# Patient Record
Sex: FEMALE | Race: WHITE | Employment: OTHER | URBAN - METROPOLITAN AREA
[De-identification: names, ages, dates, MRNs, and addresses within clinical notes are randomized per-mention and may not be internally consistent; named-entity substitution may affect disease eponyms.]

---

## 2017-02-14 ENCOUNTER — HOSPITAL ENCOUNTER (EMERGENCY)
Age: 82
Discharge: HOME | End: 2017-02-14
Attending: EMERGENCY MEDICINE
Payer: MEDICARE

## 2017-02-14 ENCOUNTER — APPOINTMENT (OUTPATIENT)
Dept: GENERAL RADIOLOGY | Age: 82
End: 2017-02-14
Payer: MEDICARE

## 2017-02-14 VITALS
OXYGEN SATURATION: 95 % | HEIGHT: 65 IN | HEART RATE: 60 BPM | BODY MASS INDEX: 22.66 KG/M2 | TEMPERATURE: 98.4 F | DIASTOLIC BLOOD PRESSURE: 67 MMHG | RESPIRATION RATE: 20 BRPM | WEIGHT: 136 LBS | SYSTOLIC BLOOD PRESSURE: 146 MMHG

## 2017-02-14 DIAGNOSIS — R07.9 CHEST PAIN, UNSPECIFIED TYPE: Primary | ICD-10-CM

## 2017-02-14 LAB
ABSOLUTE EOS #: 0.3 K/UL (ref 0–0.4)
ABSOLUTE LYMPH #: 2.4 K/UL (ref 1–4.8)
ABSOLUTE MONO #: 0.5 K/UL (ref 0.1–1.2)
ANION GAP SERPL CALCULATED.3IONS-SCNC: 15 MMOL/L (ref 9–17)
BASOPHILS # BLD: 1 % (ref 0–2)
BASOPHILS ABSOLUTE: 0 K/UL (ref 0–0.2)
BUN BLDV-MCNC: 13 MG/DL (ref 8–23)
BUN/CREAT BLD: ABNORMAL (ref 9–20)
CALCIUM SERPL-MCNC: 8.9 MG/DL (ref 8.6–10.4)
CHLORIDE BLD-SCNC: 98 MMOL/L (ref 98–107)
CO2: 24 MMOL/L (ref 20–31)
CREAT SERPL-MCNC: 0.84 MG/DL (ref 0.5–0.9)
DIFFERENTIAL TYPE: NORMAL
EOSINOPHILS RELATIVE PERCENT: 4 % (ref 1–4)
GFR AFRICAN AMERICAN: >60 ML/MIN
GFR NON-AFRICAN AMERICAN: >60 ML/MIN
GFR SERPL CREATININE-BSD FRML MDRD: ABNORMAL ML/MIN/{1.73_M2}
GFR SERPL CREATININE-BSD FRML MDRD: ABNORMAL ML/MIN/{1.73_M2}
GLUCOSE BLD-MCNC: 129 MG/DL (ref 70–99)
HCT VFR BLD CALC: 41.3 % (ref 36–46)
HEMOGLOBIN: 14.1 G/DL (ref 12–16)
LYMPHOCYTES # BLD: 34 % (ref 24–44)
MCH RBC QN AUTO: 30.1 PG (ref 26–34)
MCHC RBC AUTO-ENTMCNC: 34 G/DL (ref 31–37)
MCV RBC AUTO: 88.6 FL (ref 80–100)
MONOCYTES # BLD: 7 % (ref 2–11)
PDW BLD-RTO: 13.9 % (ref 12.5–15.4)
PLATELET # BLD: 212 K/UL (ref 140–450)
PLATELET ESTIMATE: NORMAL
PMV BLD AUTO: 9.1 FL (ref 6–12)
POC TROPONIN I: 0 NG/ML (ref 0–0.1)
POC TROPONIN I: 0.01 NG/ML (ref 0–0.1)
POC TROPONIN INTERP: NORMAL
POC TROPONIN INTERP: NORMAL
POTASSIUM SERPL-SCNC: 4.1 MMOL/L (ref 3.7–5.3)
RBC # BLD: 4.66 M/UL (ref 4–5.2)
RBC # BLD: NORMAL 10*6/UL
SEG NEUTROPHILS: 54 % (ref 36–66)
SEGMENTED NEUTROPHILS ABSOLUTE COUNT: 3.9 K/UL (ref 1.8–7.7)
SODIUM BLD-SCNC: 137 MMOL/L (ref 135–144)
WBC # BLD: 7.1 K/UL (ref 3.5–11)
WBC # BLD: NORMAL 10*3/UL

## 2017-02-14 PROCEDURE — 71020 XR CHEST STANDARD TWO VW: CPT | Performed by: RADIOLOGY

## 2017-02-14 PROCEDURE — 85025 COMPLETE CBC W/AUTO DIFF WBC: CPT

## 2017-02-14 PROCEDURE — 80048 BASIC METABOLIC PNL TOTAL CA: CPT

## 2017-02-14 PROCEDURE — 93005 ELECTROCARDIOGRAM TRACING: CPT

## 2017-02-14 PROCEDURE — 84484 ASSAY OF TROPONIN QUANT: CPT

## 2017-02-14 PROCEDURE — 6370000000 HC RX 637 (ALT 250 FOR IP): Performed by: STUDENT IN AN ORGANIZED HEALTH CARE EDUCATION/TRAINING PROGRAM

## 2017-02-14 PROCEDURE — 99285 EMERGENCY DEPT VISIT HI MDM: CPT

## 2017-02-14 PROCEDURE — 71020 XR CHEST STANDARD TWO VW: CPT

## 2017-02-14 RX ORDER — ASPIRIN 81 MG/1
324 TABLET, CHEWABLE ORAL ONCE
Status: COMPLETED | OUTPATIENT
Start: 2017-02-14 | End: 2017-02-14

## 2017-02-14 RX ADMIN — ASPIRIN 81 MG 324 MG: 81 TABLET ORAL at 20:11

## 2017-02-14 ASSESSMENT — ENCOUNTER SYMPTOMS
WHEEZING: 0
DIARRHEA: 0
ABDOMINAL PAIN: 0
CHEST TIGHTNESS: 0
SHORTNESS OF BREATH: 0
CONSTIPATION: 0
NAUSEA: 1
VOMITING: 0

## 2017-02-14 ASSESSMENT — HEART SCORE: ECG: 1

## 2017-02-16 LAB
EKG ATRIAL RATE: 61 BPM
EKG P AXIS: 64 DEGREES
EKG P-R INTERVAL: 138 MS
EKG Q-T INTERVAL: 396 MS
EKG QRS DURATION: 62 MS
EKG QTC CALCULATION (BAZETT): 398 MS
EKG R AXIS: 53 DEGREES
EKG T AXIS: 180 DEGREES
EKG VENTRICULAR RATE: 61 BPM

## 2017-08-17 ENCOUNTER — APPOINTMENT (OUTPATIENT)
Dept: GENERAL RADIOLOGY | Age: 82
End: 2017-08-17
Payer: MEDICARE

## 2017-08-17 ENCOUNTER — HOSPITAL ENCOUNTER (EMERGENCY)
Age: 82
Discharge: HOME | End: 2017-08-17
Attending: EMERGENCY MEDICINE
Payer: MEDICARE

## 2017-08-17 VITALS
BODY MASS INDEX: 22.28 KG/M2 | DIASTOLIC BLOOD PRESSURE: 49 MMHG | OXYGEN SATURATION: 98 % | HEIGHT: 64 IN | SYSTOLIC BLOOD PRESSURE: 155 MMHG | WEIGHT: 130.51 LBS | RESPIRATION RATE: 18 BRPM | TEMPERATURE: 98.3 F | HEART RATE: 62 BPM

## 2017-08-17 DIAGNOSIS — J40 BRONCHITIS: Primary | ICD-10-CM

## 2017-08-17 PROCEDURE — 71020 XR CHEST STANDARD TWO VW: CPT

## 2017-08-17 PROCEDURE — 99283 EMERGENCY DEPT VISIT LOW MDM: CPT

## 2017-08-17 PROCEDURE — 6370000000 HC RX 637 (ALT 250 FOR IP): Performed by: NURSE PRACTITIONER

## 2017-08-17 RX ORDER — AZITHROMYCIN 250 MG/1
TABLET, FILM COATED ORAL
Qty: 1 PACKET | Refills: 0 | Status: SHIPPED | OUTPATIENT
Start: 2017-08-17 | End: 2017-08-27

## 2017-08-17 RX ORDER — LEVOTHYROXINE SODIUM 0.1 MG/1
100 TABLET ORAL DAILY
COMMUNITY

## 2017-08-17 RX ORDER — BENZONATATE 100 MG/1
100 CAPSULE ORAL 3 TIMES DAILY PRN
Qty: 21 CAPSULE | Refills: 0 | Status: SHIPPED | OUTPATIENT
Start: 2017-08-17 | End: 2017-08-24

## 2017-08-17 RX ORDER — ATORVASTATIN CALCIUM 10 MG/1
10 TABLET, FILM COATED ORAL DAILY
COMMUNITY

## 2017-08-17 RX ORDER — BENZONATATE 100 MG/1
100 CAPSULE ORAL ONCE
Status: COMPLETED | OUTPATIENT
Start: 2017-08-17 | End: 2017-08-17

## 2017-08-17 RX ADMIN — BENZONATATE 100 MG: 100 CAPSULE ORAL at 10:43

## 2017-08-17 ASSESSMENT — ENCOUNTER SYMPTOMS
SHORTNESS OF BREATH: 0
WHEEZING: 0
SORE THROAT: 0
COUGH: 1
RHINORRHEA: 1
SINUS PRESSURE: 0

## 2017-08-17 ASSESSMENT — PAIN DESCRIPTION - DESCRIPTORS: DESCRIPTORS: SHARP;STABBING

## 2017-08-17 ASSESSMENT — PAIN SCALES - GENERAL: PAINLEVEL_OUTOF10: 10

## 2017-08-17 ASSESSMENT — PAIN DESCRIPTION - PAIN TYPE: TYPE: ACUTE PAIN

## 2017-08-17 ASSESSMENT — PAIN DESCRIPTION - FREQUENCY: FREQUENCY: INTERMITTENT

## 2017-08-17 ASSESSMENT — PAIN DESCRIPTION - LOCATION: LOCATION: RIB CAGE

## 2017-08-17 ASSESSMENT — PAIN DESCRIPTION - ORIENTATION: ORIENTATION: LEFT;RIGHT

## 2020-09-15 ENCOUNTER — HOSPITAL ENCOUNTER (EMERGENCY)
Age: 85
Discharge: HOME | End: 2020-09-15
Attending: EMERGENCY MEDICINE
Payer: MEDICARE

## 2020-09-15 VITALS
HEART RATE: 58 BPM | DIASTOLIC BLOOD PRESSURE: 85 MMHG | BODY MASS INDEX: 22.33 KG/M2 | TEMPERATURE: 97.8 F | OXYGEN SATURATION: 99 % | HEIGHT: 65 IN | SYSTOLIC BLOOD PRESSURE: 156 MMHG | WEIGHT: 134 LBS | RESPIRATION RATE: 14 BRPM

## 2020-09-15 LAB
-: ABNORMAL
ABSOLUTE EOS #: 0.16 K/UL (ref 0–0.44)
ABSOLUTE IMMATURE GRANULOCYTE: 0.02 K/UL (ref 0–0.3)
ABSOLUTE LYMPH #: 1.84 K/UL (ref 1.1–3.7)
ABSOLUTE MONO #: 0.58 K/UL (ref 0.1–1.2)
AMORPHOUS: ABNORMAL
ANION GAP SERPL CALCULATED.3IONS-SCNC: 10 MMOL/L (ref 9–17)
BACTERIA: ABNORMAL
BASOPHILS # BLD: 2 % (ref 0–2)
BASOPHILS ABSOLUTE: 0.1 K/UL (ref 0–0.2)
BILIRUBIN URINE: NEGATIVE
BUN BLDV-MCNC: 15 MG/DL (ref 8–23)
BUN/CREAT BLD: 17 (ref 9–20)
CALCIUM SERPL-MCNC: 8.7 MG/DL (ref 8.6–10.4)
CASTS UA: ABNORMAL /LPF
CHLORIDE BLD-SCNC: 101 MMOL/L (ref 98–107)
CO2: 28 MMOL/L (ref 20–31)
COLOR: YELLOW
COMMENT UA: ABNORMAL
CREAT SERPL-MCNC: 0.89 MG/DL (ref 0.5–0.9)
CRYSTALS, UA: ABNORMAL /HPF
DIFFERENTIAL TYPE: NORMAL
EOSINOPHILS RELATIVE PERCENT: 3 % (ref 1–4)
EPITHELIAL CELLS UA: ABNORMAL /HPF (ref 0–5)
GFR AFRICAN AMERICAN: >60 ML/MIN
GFR NON-AFRICAN AMERICAN: >60 ML/MIN
GFR SERPL CREATININE-BSD FRML MDRD: ABNORMAL ML/MIN/{1.73_M2}
GFR SERPL CREATININE-BSD FRML MDRD: ABNORMAL ML/MIN/{1.73_M2}
GLUCOSE BLD-MCNC: 106 MG/DL (ref 70–99)
GLUCOSE URINE: NEGATIVE
HCT VFR BLD CALC: 41.9 % (ref 36.3–47.1)
HEMOGLOBIN: 13.5 G/DL (ref 11.9–15.1)
IMMATURE GRANULOCYTES: 0 %
KETONES, URINE: NEGATIVE
LEUKOCYTE ESTERASE, URINE: ABNORMAL
LYMPHOCYTES # BLD: 32 % (ref 24–43)
MCH RBC QN AUTO: 30 PG (ref 25.2–33.5)
MCHC RBC AUTO-ENTMCNC: 32.2 G/DL (ref 28.4–34.8)
MCV RBC AUTO: 93.1 FL (ref 82.6–102.9)
MONOCYTES # BLD: 10 % (ref 3–12)
MUCUS: ABNORMAL
NITRITE, URINE: NEGATIVE
NRBC AUTOMATED: 0 PER 100 WBC
OTHER OBSERVATIONS UA: ABNORMAL
PDW BLD-RTO: 13.2 % (ref 11.8–14.4)
PH UA: 6.5 (ref 5–8)
PLATELET # BLD: 181 K/UL (ref 138–453)
PLATELET ESTIMATE: NORMAL
PMV BLD AUTO: 10.3 FL (ref 8.1–13.5)
POTASSIUM SERPL-SCNC: 4.8 MMOL/L (ref 3.7–5.3)
PROTEIN UA: ABNORMAL
RBC # BLD: 4.5 M/UL (ref 3.95–5.11)
RBC # BLD: NORMAL 10*6/UL
RBC UA: ABNORMAL /HPF (ref 0–2)
RENAL EPITHELIAL, UA: ABNORMAL /HPF
SEG NEUTROPHILS: 53 % (ref 36–65)
SEGMENTED NEUTROPHILS ABSOLUTE COUNT: 3.11 K/UL (ref 1.5–8.1)
SODIUM BLD-SCNC: 139 MMOL/L (ref 135–144)
SPECIFIC GRAVITY UA: 1.01 (ref 1–1.03)
TRICHOMONAS: ABNORMAL
TURBIDITY: CLEAR
URINE HGB: NEGATIVE
UROBILINOGEN, URINE: NORMAL
WBC # BLD: 5.8 K/UL (ref 3.5–11.3)
WBC # BLD: NORMAL 10*3/UL
WBC UA: ABNORMAL /HPF (ref 0–5)
YEAST: ABNORMAL

## 2020-09-15 PROCEDURE — 99284 EMERGENCY DEPT VISIT MOD MDM: CPT

## 2020-09-15 PROCEDURE — 85025 COMPLETE CBC W/AUTO DIFF WBC: CPT

## 2020-09-15 PROCEDURE — 81001 URINALYSIS AUTO W/SCOPE: CPT

## 2020-09-15 PROCEDURE — 36415 COLL VENOUS BLD VENIPUNCTURE: CPT

## 2020-09-15 PROCEDURE — 80048 BASIC METABOLIC PNL TOTAL CA: CPT

## 2020-09-15 RX ORDER — AMLODIPINE BESYLATE 2.5 MG/1
2.5 TABLET ORAL DAILY
Status: ON HOLD | COMMUNITY
Start: 2020-08-18 | End: 2021-06-27 | Stop reason: HOSPADM

## 2020-09-15 RX ORDER — CETIRIZINE HYDROCHLORIDE 10 MG/1
10 TABLET ORAL DAILY
COMMUNITY

## 2020-09-15 RX ORDER — CIPROFLOXACIN 500 MG/1
500 TABLET, FILM COATED ORAL 2 TIMES DAILY
Qty: 20 TABLET | Refills: 0 | Status: SHIPPED | OUTPATIENT
Start: 2020-09-15 | End: 2020-09-25

## 2020-09-15 ASSESSMENT — ENCOUNTER SYMPTOMS
BACK PAIN: 0
ABDOMINAL PAIN: 0
BLOOD IN STOOL: 0
SHORTNESS OF BREATH: 0
CONSTIPATION: 0
DIARRHEA: 0
NAUSEA: 0
COUGH: 0
ABDOMINAL DISTENTION: 1
APNEA: 0
VOMITING: 0

## 2020-09-15 ASSESSMENT — PAIN DESCRIPTION - FREQUENCY: FREQUENCY: CONTINUOUS

## 2020-09-15 ASSESSMENT — PAIN DESCRIPTION - LOCATION: LOCATION: FLANK

## 2020-09-15 ASSESSMENT — PAIN DESCRIPTION - PROGRESSION: CLINICAL_PROGRESSION: GRADUALLY WORSENING

## 2020-09-15 ASSESSMENT — PAIN DESCRIPTION - ONSET: ONSET: ON-GOING

## 2020-09-15 ASSESSMENT — PAIN SCALES - GENERAL: PAINLEVEL_OUTOF10: 5

## 2020-09-15 ASSESSMENT — PAIN DESCRIPTION - ORIENTATION: ORIENTATION: LEFT

## 2020-09-15 ASSESSMENT — PAIN DESCRIPTION - PAIN TYPE: TYPE: ACUTE PAIN

## 2020-09-15 NOTE — ED PROVIDER NOTES
[address] [place] [address] ED  Emergency Department Encounter  EmergencyMedicine Resident     Pt Name:Yolis Humphrey  MRN: [id number]  [place] 1935  Date of evaluation: 9/15/20  PCP:  MD Wil Randolph       Chief Complaint   Patient presents with    Flank Pain     left side and flank pain for approx 3 weeks, worse over past 2 days       HISTORY OF PRESENT ILLNESS  (Location/Symptom, Timing/Onset, Context/Setting, Quality, Duration, Modifying Factors, Severity.)      Val Holman is a 81 y.o. female with significant past medical history of joint replacement, hypercholesterolemia, hypothyroidism who presents to the emergency department at [facility] today with complaints of left-sided flank pain. Patient states for the last few weeks she has had increasing pain and discomfort, rates the pain as 40 5 out of 10, has not taken any medication at home. States she has not noticed any blood in her urine except for one episode 3 months ago for which she was treated. Has had no foul odor to her urine, states she usually always has urinary urgency and frequency. Has not noticed any blood in her stool, denies any shortness of breath or chest pain, no headaches or visual changes. Has not noticed any swelling or weakness of her upper and lower extremities. She states the pain is not debilitating, however she also has abdominal bloating and distention due to an ileostomy reversed in 2001. Patient states she takes enough fiber in her diet, is otherwise active in her daily life. Does not walk as much as she used to. PAST MEDICAL / SURGICAL / SOCIAL / FAMILY HISTORY      has a past medical history of Hyperlipidemia, Hypertension, and Thyroid disease. has a past surgical history that includes shoulder surgery; Cataract removal; joint replacement; Abdomen surgery; colostomy; Revision Colostomy; Colon surgery; and back surgery. Social History     Socioeconomic History    Marital status:   Spouse name: Not on file    Number of children: Not on file    Years of education: Not on file    Highest education level: Not on file   Occupational History    Not on file   Social Needs    Financial resource strain: Not on file    Food insecurity     Worry: Not on file     Inability: Not on file    Transportation needs     Medical: Not on file     Non-medical: Not on file   Tobacco Use    Smoking status: Former Smoker    Smokeless tobacco: Never Used   Substance and Sexual Activity    Alcohol use: Yes     Alcohol/week: 1.0 standard drinks     Types: 1 Glasses of wine per week    Drug use: No    Sexual activity: Not on file   Lifestyle    Physical activity     Days per week: Not on file     Minutes per session: Not on file    Stress: Not on file   Relationships    Social connections     Talks on phone: Not on file     Gets together: Not on file     Attends Christianity service: Not on file     Active member of club or organization: Not on file     Attends meetings of clubs or organizations: Not on file     Relationship status: Not on file    Intimate partner violence     Fear of current or ex partner: Not on file     Emotionally abused: Not on file     Physically abused: Not on file     Forced sexual activity: Not on file   Other Topics Concern    Not on file   Social History Narrative    Not on file       History reviewed. No pertinent family history. Allergies:  Morphine    Home Medications:  Prior to Admission medications    Medication Sig Start Date End Date Taking?  Authorizing Provider   amLODIPine (NORVASC) 2.5 MG tablet TAKE 1 TABLET BY MOUTH ONE TIME A DAY 8/18/20  Yes Historical Provider, MD   cetirizine (ZYRTEC) 10 MG tablet Take 10 mg by mouth daily   Yes Historical Provider, MD   ciprofloxacin (CIPRO) 500 MG tablet Take 1 tablet by mouth 2 times daily for 10 days 9/15/20 9/25/20 Yes Patricia Cook MD   atorvastatin (LIPITOR) 10 MG tablet Take 10 mg by mouth daily   Yes Historical General: No focal deficit present. Mental Status: She is alert and oriented to person, place, and time. Mental status is at baseline.          DIFFERENTIAL  DIAGNOSIS     PLAN (LABS / IMAGING / EKG):  Orders Placed This Encounter   Procedures    CBC Auto Differential    Basic Metabolic Panel w/ Reflex to MG    Urinalysis, Routine    Microscopic Urinalysis       MEDICATIONS ORDERED:  Orders Placed This Encounter   Medications    ciprofloxacin (CIPRO) 500 MG tablet     Sig: Take 1 tablet by mouth 2 times daily for 10 days     Dispense:  20 tablet     Refill:  0       DDX:   Pyelonephritis  Acute cystitis  aortic dissection      DIAGNOSTIC RESULTS / EMERGENCY DEPARTMENT COURSE / MDM     Results for orders placed or performed during the hospital encounter of 09/15/20   CBC Auto Differential   Result Value Ref Range    WBC 5.8 3.5 - 11.3 k/uL    RBC 4.50 3.95 - 5.11 m/uL    Hemoglobin 13.5 11.9 - 15.1 g/dL    Hematocrit 41.9 36.3 - 47.1 %    MCV 93.1 82.6 - 102.9 fL    MCH 30.0 25.2 - 33.5 pg    MCHC 32.2 28.4 - 34.8 g/dL    RDW 13.2 11.8 - 14.4 %    Platelets 753 707 - 012 k/uL    MPV 10.3 8.1 - 13.5 fL    NRBC Automated 0.0 0.0 per 100 WBC    Differential Type NOT REPORTED     Seg Neutrophils 53 36 - 65 %    Lymphocytes 32 24 - 43 %    Monocytes 10 3 - 12 %    Eosinophils % 3 1 - 4 %    Basophils 2 0 - 2 %    Immature Granulocytes 0 0 %    Segs Absolute 3.11 1.50 - 8.10 k/uL    Absolute Lymph # 1.84 1.10 - 3.70 k/uL    Absolute Mono # 0.58 0.10 - 1.20 k/uL    Absolute Eos # 0.16 0.00 - 0.44 k/uL    Basophils Absolute 0.10 0.00 - 0.20 k/uL    Absolute Immature Granulocyte 0.02 0.00 - 0.30 k/uL    WBC Morphology NOT REPORTED     RBC Morphology NOT REPORTED     Platelet Estimate NOT REPORTED    Basic Metabolic Panel w/ Reflex to MG   Result Value Ref Range    Glucose 106 (H) 70 - 99 mg/dL    BUN 15 8 - 23 mg/dL    CREATININE 0.89 0.50 - 0.90 mg/dL    Bun/Cre Ratio 17 9 - 20    Calcium 8.7 8.6 - 10.4 mg/dL NOT REPORTED   Bacteria, UA FEW(!)   Mucus, UA 2+(!)   Trichomonas, UA NOT REPORTED   Amorphous, UA NOT REPORTED   Other Observations UA NOT REPORTED   Yeast, Urine NOT REPORTED [NT]   1046 Urinalysis, Routine(!):    Color, UA YELLOW   Turbidity UA CLEAR   Glucose, UA NEGATIVE   Bilirubin, Urine NEGATIVE   Ketones, Urine NEGATIVE   Specific [place], UA 1.015   Urine Hgb NEGATIVE   pH, UA 6.5   Protein, UA 1+(!)   Urobilinogen, Urine Normal   Nitrite, Urine NEGATIVE   Leukocyte Esterase, Urine TRACE(!)   Urinalysis Comments NOT REPORTED [NT]   3905 Basic Metabolic Panel w/ Reflex to MG(!):    Glucose 106(!)   BUN 15   Creatinine 0.89   Bun/Cre Ratio 17   Calcium 8.7   Sodium 139   Potassium 4.8   Chloride 101   CO2 28   Anion Gap 10   GFR Non- >60   GFR  >60   GFR Comment        GFR Staging NOT REPORTED [NT]   1046 CBC Auto Differential:    WBC 5.8   RBC 4.50   Hemoglobin Quant 13.5   Hematocrit 41.9   MCV 93.1   MCH 30.0   MCHC 32.2   RDW 13.2   Platelet Count 797   MPV 10.3   NRBC Automated 0.0   Differential Type NOT REPORTED   Seg Neutrophils 53   Lymphocytes 32   Monocytes 10   Eosinophils % 3   Basophils 2   Immature Granulocytes 0   Segs Absolute 3.11   Absolute Lymph # 1.84   Absolute Mono # 0.58   Absolute Eos # 0.16   Basophils Absolute 0.10   Absolute Immature Granulocyte 0.02   WBC Morphology NOT REPORTED   RBC Mo. .. [NT]   1046 BP(!): 156/85 [NT]   1046 Pulse: 58 [NT]   1046 Resp: 14 [NT]   1046 SpO2: 99 % [NT]   1046 Temp: 97.8 °F (36.6 °C) [NT]      ED Course User Index  [NT] Edwina Cochran MD         PROCEDURES:  None    CONSULTS:  None    CRITICAL CARE:  None    FINAL IMPRESSION      1. Flank pain    2.  Acute cystitis without hematuria          DISPOSITION / PLAN     DISPOSITION Decision To Discharge 09/15/2020 10:43:57 AM  Home     PATIENT REFERRED TO:  Catia Johnston MD  [address] [facility] [address] [phone]    Go in 1 week  As needed      DISCHARGE MEDICATIONS:  New Prescriptions    CIPROFLOXACIN (CIPRO) 500 MG TABLET    Take 1 tablet by mouth 2 times daily for 10 days         Corrinne Craze, MD   Emergency Medicine Rotating Resident  Family Medicine Residency, PGY-2   [facility]  9/15/2020 10:45 AM     (Please note that portions of thisnote were completed with a voice recognition program.  Efforts were made to edit the dictations but occasionally words are mis-transcribed.)       Corrinne Craze, MD  Resident  09/15/20 1045

## 2020-09-15 NOTE — ED NOTES
Patient came in with complaints of left sided flank plank. Stated stomach feels distended. She has been having issues for about 2 weeks and its progressively getting worse. She rates her pain at a 4. She stated it hurts when she sits down and bends forward. She reports no sign of N/V. Normal bowel movements. Alert and oriented. Independent.       Tom Holcomb RN  09/15/20 5145

## 2020-09-15 NOTE — ED PROVIDER NOTES
The patient was seen and examined by me in conjunction with the resident. I agree with his/her assessment and treatment plan. I have no clinical suspicion of pyelonephritis.      Lauren Castaneda MD  09/15/20 2569

## 2020-11-17 RX ORDER — SODIUM CHLORIDE, SODIUM LACTATE, POTASSIUM CHLORIDE, CALCIUM CHLORIDE 600; 310; 30; 20 MG/100ML; MG/100ML; MG/100ML; MG/100ML
1000 INJECTION, SOLUTION INTRAVENOUS CONTINUOUS
Status: CANCELLED | OUTPATIENT
Start: 2020-11-17

## 2020-11-18 ENCOUNTER — HOSPITAL ENCOUNTER (OUTPATIENT)
Dept: PREADMISSION TESTING | Age: 85
Discharge: HOME | End: 2020-11-22
Payer: MEDICARE

## 2020-11-18 VITALS
RESPIRATION RATE: 20 BRPM | HEIGHT: 65 IN | TEMPERATURE: 96.8 F | BODY MASS INDEX: 22.01 KG/M2 | SYSTOLIC BLOOD PRESSURE: 170 MMHG | HEART RATE: 54 BPM | DIASTOLIC BLOOD PRESSURE: 71 MMHG | WEIGHT: 132.08 LBS | OXYGEN SATURATION: 98 %

## 2020-11-18 LAB
ANION GAP SERPL CALCULATED.3IONS-SCNC: 11 MMOL/L (ref 9–17)
BUN BLDV-MCNC: 18 MG/DL (ref 8–23)
CHLORIDE BLD-SCNC: 101 MMOL/L (ref 98–107)
CO2: 26 MMOL/L (ref 20–31)
CREAT SERPL-MCNC: 0.85 MG/DL (ref 0.5–0.9)
GFR AFRICAN AMERICAN: >60 ML/MIN
GFR NON-AFRICAN AMERICAN: >60 ML/MIN
GFR SERPL CREATININE-BSD FRML MDRD: NORMAL ML/MIN/{1.73_M2}
GFR SERPL CREATININE-BSD FRML MDRD: NORMAL ML/MIN/{1.73_M2}
GLUCOSE BLD-MCNC: 92 MG/DL (ref 70–99)
HCT VFR BLD CALC: 45.6 % (ref 36.3–47.1)
HEMOGLOBIN: 14.5 G/DL (ref 11.9–15.1)
MCH RBC QN AUTO: 29.4 PG (ref 25.2–33.5)
MCHC RBC AUTO-ENTMCNC: 31.8 G/DL (ref 28.4–34.8)
MCV RBC AUTO: 92.5 FL (ref 82.6–102.9)
NRBC AUTOMATED: 0 PER 100 WBC
PDW BLD-RTO: 13.6 % (ref 11.8–14.4)
PLATELET # BLD: 217 K/UL (ref 138–453)
PMV BLD AUTO: 10.4 FL (ref 8.1–13.5)
POTASSIUM SERPL-SCNC: 5.1 MMOL/L (ref 3.7–5.3)
RBC # BLD: 4.93 M/UL (ref 3.95–5.11)
SODIUM BLD-SCNC: 138 MMOL/L (ref 135–144)
WBC # BLD: 7.1 K/UL (ref 3.5–11.3)

## 2020-11-18 PROCEDURE — 85027 COMPLETE CBC AUTOMATED: CPT

## 2020-11-18 PROCEDURE — 80051 ELECTROLYTE PANEL: CPT

## 2020-11-18 PROCEDURE — 93005 ELECTROCARDIOGRAM TRACING: CPT | Performed by: ANESTHESIOLOGY

## 2020-11-18 PROCEDURE — 84520 ASSAY OF UREA NITROGEN: CPT

## 2020-11-18 PROCEDURE — 87086 URINE CULTURE/COLONY COUNT: CPT

## 2020-11-18 PROCEDURE — 82947 ASSAY GLUCOSE BLOOD QUANT: CPT

## 2020-11-18 PROCEDURE — 82565 ASSAY OF CREATININE: CPT

## 2020-11-18 PROCEDURE — 36415 COLL VENOUS BLD VENIPUNCTURE: CPT

## 2020-11-18 RX ORDER — LOPERAMIDE HYDROCHLORIDE 2 MG/1
2 CAPSULE ORAL 4 TIMES DAILY PRN
COMMUNITY

## 2020-11-18 RX ORDER — ACETAMINOPHEN 500 MG
1 TABLET ORAL DAILY
COMMUNITY

## 2020-11-18 NOTE — PROGRESS NOTES
Anesthesia Focused Assessment    STOP-BANG Sleep Apnea Questionnaire    SNORE loudly (heard through closed doors)? No  TIRED, fatigued, sleepy during daytime? No  OBSERVED stopping breathing during sleep? No  High blood PRESSURE being treated? Yes    BMI over 35? No  AGE over 48? Yes  NECK circumference over 16\"? No  GENDER (male)? No             Total 2  High risk 5-8  Intermediate risk 3-4  Low risk 0-2    Obstructive Sleep Apnea: denies  If YES, machine used: no     Type 1 DM:   no  T2DM:  no    Coronary Artery Disease:  no  Hypertension:  yes    Active smoker:  no  Drinks Alcohol:  Wine 1 glass daily    Dentition: molar crowns    Defib / AICD / Pacemaker: no      Renal Failure/dialysis:  no    Patient was evaluated in PAT & anesthesia guidelines were applied. NPO guidelines, medication instructions and scheduled arrival time were reviewed with patient. Hx of anesthesia complications:  no  Family hx of anesthesia complications:  no                                                                                                                     Anesthesia contacted:   Yes, Dr. Ashlyn Shipman or cardiac clearance ordered: no    Patient has a history of stress test in 2017, echo, evaluation by cardiology (notes in chart). 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 Patient has a systolic murmur, aortic stenosis on echo 2017. Patient denies any cardiac symptoms including chest pain, shortness of breath, significant edema, etc.  Patient and case was reviewed with Dr. Benji Ling. No clearance requested. Will fax EKG and labs to PCP for completeness.     Kamaljit Wills PA-C  11/18/20  1:44 PM

## 2020-11-18 NOTE — H&P
History and Physical    Pt Name: Rose Sanchez  MRN: [id number]  YOB: 1935  Date of evaluation: 11/18/2020    SUBJECTIVE:   History of Chief Complaint:    Patient complains of bladder tumors. She was experiencing hematuria, so sought evaluation. She had a cystoscopy and was found to have four \"small spots\" in the bladder. She has been diagnosed with bladder tumors, scheduled for cystoscopy and TURBT. Patient denies any further hematuria, only complains of her chronic urinary frequency which has not changed. Past Medical History    has a past medical history of Allergic rhinitis, Aortic stenosis, Arthritis, History of echocardiogram, History of stress test, Hyperlipidemia, Hypertension, Murmur, Thyroid disease, Wears prescription eyeglasses, and Wellness examination. Past Surgical History   has a past surgical history that includes shoulder surgery; Cataract removal; Colon surgery; Colonoscopy; Endoscopy, colon, diagnostic; ileostomy or jejunostomy; lumbar laminectomy (x 2); and Total knee arthroplasty (Bilateral). Medications  Prior to Admission medications    Medication Sig Start Date End Date Taking?  Authorizing Provider   loperamide (IMODIUM) 2 MG capsule Take 2 mg by mouth daily   Yes Historical Provider, MD   Probiotic Product (PROBIOTIC-10 PO) Take by mouth   Yes Historical Provider, MD   amLODIPine (NORVASC) 2.5 MG tablet Dr. Mirlande Campos 8/18/20  Yes Historical Provider, MD   cetirizine (ZYRTEC) 10 MG tablet Take 10 mg by mouth daily   Yes Historical Provider, MD   atorvastatin (LIPITOR) 10 MG tablet Take 10 mg by mouth daily Dr. Claudia Vigil Provider, MD   aspirin 81 MG tablet Take 81 mg by mouth daily Stopped 11/9/20 for surgery  Dr. Mirlande Campos prescribed   Yes Historical Provider, MD   Calcium Carbonate-Vit D-Min (CALCIUM 1200) 1369-0212 MG-UNIT CHEW daily    Historical Provider, MD   levothyroxine (SYNTHROID) 88 MCG tablet Take 88 mcg by mouth Daily Dr. Gerrianne Gilford Provider, MD Allergies  is allergic to augmentin [amoxicillin-pot clavulanate]; morphine; and tequin [gatifloxacin]. Family History  family history includes Diabetes in her mother. Social History   reports that she has quit smoking. She has never used smokeless tobacco.   reports current alcohol use of about 1.0 standard drinks of alcohol per week. reports no history of drug use. Marital Status   Occupation retired    OBJECTIVE:   VITALS:  height is 5' 5\" (1.651 m) and weight is 132 lb 1.3 oz (59.9 kg). Her temporal temperature is 96.8 °F (36 °C). Her blood pressure is 170/71 (abnormal) and her pulse is 54. Her respiration is 20 and oxygen saturation is 98%. CONSTITUTIONAL:alert & oriented x 3, no acute distress. Very pleasant. Looks younger than stated age. SKIN:  Warm and dry, no rashes on exposed areas of skin. HEAD:  Normocephalic, atraumatic. EYES: PERRL. EOMs intact. Wearing glasses. EARS:  Hearing grossly WNL. NOSE:  Nares patent. No rhinorrhea. MOUTH/THROAT:  benign  NECK:supple, no lymphadenopathy  LUNGS: Clear to auscultation bilaterally, no wheezes. CARDIOVASCULAR: RRR. Systolic murmur noted. ABDOMEN: soft, non tender, non distended. EXTREMITIES: trace edema bilateral lower extremities. Testing:   EK20  Labs pending: drawn 2020     IMPRESSIONS:   1. Bladder tumors  2.  has a past medical history of Allergic rhinitis, Aortic stenosis (), Arthritis, History of echocardiogram (2017), History of stress test (2017), Hyperlipidemia, Hypertension, Murmur, Thyroid disease, Wears prescription eyeglasses, and Wellness examination. PLANS:   1.  Cystoscopy, TURBT    MANUEL FOLEY PA-C  Electronically signed 2020 at 2:45 PM

## 2020-11-19 ENCOUNTER — HOSPITAL ENCOUNTER (OUTPATIENT)
Dept: PREADMISSION TESTING | Age: 85
Setting detail: SPECIMEN
Discharge: HOME | End: 2020-11-23
Payer: MEDICARE

## 2020-11-19 LAB
CULTURE: NORMAL
EKG ATRIAL RATE: 54 BPM
EKG P AXIS: 66 DEGREES
EKG P-R INTERVAL: 142 MS
EKG Q-T INTERVAL: 448 MS
EKG QRS DURATION: 76 MS
EKG QTC CALCULATION (BAZETT): 424 MS
EKG R AXIS: 40 DEGREES
EKG T AXIS: 142 DEGREES
EKG VENTRICULAR RATE: 54 BPM
Lab: NORMAL
SPECIMEN DESCRIPTION: NORMAL

## 2020-11-19 PROCEDURE — U0003 INFECTIOUS AGENT DETECTION BY NUCLEIC ACID (DNA OR RNA); SEVERE ACUTE RESPIRATORY SYNDROME CORONAVIRUS 2 (SARS-COV-2) (CORONAVIRUS DISEASE [COVID-19]), AMPLIFIED PROBE TECHNIQUE, MAKING USE OF HIGH THROUGHPUT TECHNOLOGIES AS DESCRIBED BY CMS-2020-01-R: HCPCS

## 2020-11-19 PROCEDURE — 93010 ELECTROCARDIOGRAM REPORT: CPT | Performed by: INTERNAL MEDICINE

## 2020-11-21 LAB — SARS-COV-2, NAA: NOT DETECTED

## 2020-11-23 ENCOUNTER — HOSPITAL ENCOUNTER (OUTPATIENT)
Age: 85
Setting detail: OUTPATIENT SURGERY
Discharge: HOME | End: 2020-11-23
Attending: UROLOGY | Admitting: UROLOGY
Payer: MEDICARE

## 2020-11-23 ENCOUNTER — ANESTHESIA (OUTPATIENT)
Dept: OPERATING ROOM | Age: 85
End: 2020-11-23
Payer: MEDICARE

## 2020-11-23 ENCOUNTER — ANESTHESIA EVENT (OUTPATIENT)
Dept: OPERATING ROOM | Age: 85
End: 2020-11-23
Payer: MEDICARE

## 2020-11-23 VITALS — DIASTOLIC BLOOD PRESSURE: 103 MMHG | SYSTOLIC BLOOD PRESSURE: 156 MMHG | OXYGEN SATURATION: 99 % | TEMPERATURE: 95.8 F

## 2020-11-23 VITALS
HEART RATE: 51 BPM | RESPIRATION RATE: 14 BRPM | OXYGEN SATURATION: 97 % | BODY MASS INDEX: 21.99 KG/M2 | WEIGHT: 132 LBS | HEIGHT: 65 IN | SYSTOLIC BLOOD PRESSURE: 159 MMHG | TEMPERATURE: 97.7 F | DIASTOLIC BLOOD PRESSURE: 49 MMHG

## 2020-11-23 PROCEDURE — 2500000003 HC RX 250 WO HCPCS: Performed by: NURSE ANESTHETIST, CERTIFIED REGISTERED

## 2020-11-23 PROCEDURE — 2720000010 HC SURG SUPPLY STERILE: Performed by: UROLOGY

## 2020-11-23 PROCEDURE — 3700000000 HC ANESTHESIA ATTENDED CARE: Performed by: UROLOGY

## 2020-11-23 PROCEDURE — 7100000001 HC PACU RECOVERY - ADDTL 15 MIN: Performed by: UROLOGY

## 2020-11-23 PROCEDURE — 2580000003 HC RX 258: Performed by: ANESTHESIOLOGY

## 2020-11-23 PROCEDURE — 2580000003 HC RX 258: Performed by: UROLOGY

## 2020-11-23 PROCEDURE — 3700000001 HC ADD 15 MINUTES (ANESTHESIA): Performed by: UROLOGY

## 2020-11-23 PROCEDURE — 6370000000 HC RX 637 (ALT 250 FOR IP): Performed by: ANESTHESIOLOGY

## 2020-11-23 PROCEDURE — 6360000002 HC RX W HCPCS: Performed by: NURSE ANESTHETIST, CERTIFIED REGISTERED

## 2020-11-23 PROCEDURE — 6360000002 HC RX W HCPCS: Performed by: UROLOGY

## 2020-11-23 PROCEDURE — 7100000041 HC SPAR PHASE II RECOVERY - ADDTL 15 MIN: Performed by: UROLOGY

## 2020-11-23 PROCEDURE — 88307 TISSUE EXAM BY PATHOLOGIST: CPT

## 2020-11-23 PROCEDURE — 3600000002 HC SURGERY LEVEL 2 BASE: Performed by: UROLOGY

## 2020-11-23 PROCEDURE — 7100000040 HC SPAR PHASE II RECOVERY - FIRST 15 MIN: Performed by: UROLOGY

## 2020-11-23 PROCEDURE — 6360000002 HC RX W HCPCS: Performed by: STUDENT IN AN ORGANIZED HEALTH CARE EDUCATION/TRAINING PROGRAM

## 2020-11-23 PROCEDURE — 3600000012 HC SURGERY LEVEL 2 ADDTL 15MIN: Performed by: UROLOGY

## 2020-11-23 PROCEDURE — 7100000000 HC PACU RECOVERY - FIRST 15 MIN: Performed by: UROLOGY

## 2020-11-23 PROCEDURE — 2709999900 HC NON-CHARGEABLE SUPPLY: Performed by: UROLOGY

## 2020-11-23 RX ORDER — MAGNESIUM HYDROXIDE 1200 MG/15ML
LIQUID ORAL CONTINUOUS PRN
Status: COMPLETED | OUTPATIENT
Start: 2020-11-23 | End: 2020-11-23

## 2020-11-23 RX ORDER — SODIUM CHLORIDE, SODIUM LACTATE, POTASSIUM CHLORIDE, CALCIUM CHLORIDE 600; 310; 30; 20 MG/100ML; MG/100ML; MG/100ML; MG/100ML
INJECTION, SOLUTION INTRAVENOUS CONTINUOUS
Status: DISCONTINUED | OUTPATIENT
Start: 2020-11-23 | End: 2020-11-23 | Stop reason: HOSPADM

## 2020-11-23 RX ORDER — DEXAMETHASONE SODIUM PHOSPHATE 4 MG/ML
INJECTION, SOLUTION INTRA-ARTICULAR; INTRALESIONAL; INTRAMUSCULAR; INTRAVENOUS; SOFT TISSUE PRN
Status: DISCONTINUED | OUTPATIENT
Start: 2020-11-23 | End: 2020-11-23 | Stop reason: SDUPTHER

## 2020-11-23 RX ORDER — ROCURONIUM BROMIDE 10 MG/ML
INJECTION, SOLUTION INTRAVENOUS PRN
Status: DISCONTINUED | OUTPATIENT
Start: 2020-11-23 | End: 2020-11-23 | Stop reason: SDUPTHER

## 2020-11-23 RX ORDER — NEOSTIGMINE METHYLSULFATE 5 MG/5 ML
SYRINGE (ML) INTRAVENOUS PRN
Status: DISCONTINUED | OUTPATIENT
Start: 2020-11-23 | End: 2020-11-23 | Stop reason: SDUPTHER

## 2020-11-23 RX ORDER — AMLODIPINE BESYLATE 2.5 MG/1
2.5 TABLET ORAL ONCE
Status: COMPLETED | OUTPATIENT
Start: 2020-11-23 | End: 2020-11-23

## 2020-11-23 RX ORDER — ONDANSETRON 2 MG/ML
INJECTION INTRAMUSCULAR; INTRAVENOUS PRN
Status: DISCONTINUED | OUTPATIENT
Start: 2020-11-23 | End: 2020-11-23 | Stop reason: SDUPTHER

## 2020-11-23 RX ORDER — MITOMYCIN 40 MG/80ML
40 INJECTION, POWDER, LYOPHILIZED, FOR SOLUTION INTRAVENOUS ONCE
Status: DISCONTINUED | OUTPATIENT
Start: 2020-11-23 | End: 2020-11-23 | Stop reason: HOSPADM

## 2020-11-23 RX ORDER — MITOMYCIN 20 MG/40ML
INJECTION, POWDER, LYOPHILIZED, FOR SOLUTION INTRAVENOUS PRN
Status: DISCONTINUED | OUTPATIENT
Start: 2020-11-23 | End: 2020-11-23 | Stop reason: ALTCHOICE

## 2020-11-23 RX ORDER — LIDOCAINE HYDROCHLORIDE 10 MG/ML
INJECTION, SOLUTION EPIDURAL; INFILTRATION; INTRACAUDAL; PERINEURAL PRN
Status: DISCONTINUED | OUTPATIENT
Start: 2020-11-23 | End: 2020-11-23 | Stop reason: SDUPTHER

## 2020-11-23 RX ORDER — FENTANYL CITRATE 50 UG/ML
INJECTION, SOLUTION INTRAMUSCULAR; INTRAVENOUS PRN
Status: DISCONTINUED | OUTPATIENT
Start: 2020-11-23 | End: 2020-11-23 | Stop reason: SDUPTHER

## 2020-11-23 RX ORDER — SODIUM CHLORIDE, SODIUM LACTATE, POTASSIUM CHLORIDE, CALCIUM CHLORIDE 600; 310; 30; 20 MG/100ML; MG/100ML; MG/100ML; MG/100ML
1000 INJECTION, SOLUTION INTRAVENOUS CONTINUOUS
Status: DISCONTINUED | OUTPATIENT
Start: 2020-11-23 | End: 2020-11-23 | Stop reason: HOSPADM

## 2020-11-23 RX ORDER — DIPHENHYDRAMINE HYDROCHLORIDE 50 MG/ML
12.5 INJECTION INTRAMUSCULAR; INTRAVENOUS
Status: DISCONTINUED | OUTPATIENT
Start: 2020-11-23 | End: 2020-11-23 | Stop reason: HOSPADM

## 2020-11-23 RX ORDER — FENTANYL CITRATE 50 UG/ML
25 INJECTION, SOLUTION INTRAMUSCULAR; INTRAVENOUS EVERY 5 MIN PRN
Status: DISCONTINUED | OUTPATIENT
Start: 2020-11-23 | End: 2020-11-23 | Stop reason: HOSPADM

## 2020-11-23 RX ORDER — ONDANSETRON 2 MG/ML
4 INJECTION INTRAMUSCULAR; INTRAVENOUS
Status: DISCONTINUED | OUTPATIENT
Start: 2020-11-23 | End: 2020-11-23 | Stop reason: HOSPADM

## 2020-11-23 RX ORDER — LABETALOL HYDROCHLORIDE 5 MG/ML
5 INJECTION, SOLUTION INTRAVENOUS EVERY 10 MIN PRN
Status: DISCONTINUED | OUTPATIENT
Start: 2020-11-23 | End: 2020-11-23 | Stop reason: HOSPADM

## 2020-11-23 RX ORDER — PROPOFOL 10 MG/ML
INJECTION, EMULSION INTRAVENOUS PRN
Status: DISCONTINUED | OUTPATIENT
Start: 2020-11-23 | End: 2020-11-23 | Stop reason: SDUPTHER

## 2020-11-23 RX ORDER — GLYCOPYRROLATE 1 MG/5 ML
SYRINGE (ML) INTRAVENOUS PRN
Status: DISCONTINUED | OUTPATIENT
Start: 2020-11-23 | End: 2020-11-23 | Stop reason: SDUPTHER

## 2020-11-23 RX ADMIN — SODIUM CHLORIDE, POTASSIUM CHLORIDE, SODIUM LACTATE AND CALCIUM CHLORIDE 1000 ML: 600; 310; 30; 20 INJECTION, SOLUTION INTRAVENOUS at 09:35

## 2020-11-23 RX ADMIN — Medication 0.2 MG: at 10:11

## 2020-11-23 RX ADMIN — LIDOCAINE HYDROCHLORIDE 50 MG: 10 INJECTION, SOLUTION EPIDURAL; INFILTRATION; INTRACAUDAL; PERINEURAL at 09:52

## 2020-11-23 RX ADMIN — Medication 3 MG: at 11:24

## 2020-11-23 RX ADMIN — FENTANYL CITRATE 25 MCG: 50 INJECTION, SOLUTION INTRAMUSCULAR; INTRAVENOUS at 09:52

## 2020-11-23 RX ADMIN — DEXAMETHASONE SODIUM PHOSPHATE 4 MG: 4 INJECTION, SOLUTION INTRAMUSCULAR; INTRAVENOUS at 10:04

## 2020-11-23 RX ADMIN — PROPOFOL 150 MG: 10 INJECTION, EMULSION INTRAVENOUS at 09:52

## 2020-11-23 RX ADMIN — ROCURONIUM BROMIDE 10 MG: 10 INJECTION, SOLUTION INTRAVENOUS at 11:01

## 2020-11-23 RX ADMIN — ROCURONIUM BROMIDE 30 MG: 10 INJECTION, SOLUTION INTRAVENOUS at 09:52

## 2020-11-23 RX ADMIN — Medication 0.6 MG: at 11:24

## 2020-11-23 RX ADMIN — FENTANYL CITRATE 25 MCG: 50 INJECTION, SOLUTION INTRAMUSCULAR; INTRAVENOUS at 11:38

## 2020-11-23 RX ADMIN — AMLODIPINE BESYLATE 2.5 MG: 2.5 TABLET ORAL at 13:33

## 2020-11-23 RX ADMIN — CEFAZOLIN 2 G: 10 INJECTION, POWDER, FOR SOLUTION INTRAVENOUS at 10:06

## 2020-11-23 RX ADMIN — ONDANSETRON 4 MG: 2 INJECTION INTRAMUSCULAR; INTRAVENOUS at 10:39

## 2020-11-23 RX ADMIN — FENTANYL CITRATE 50 MCG: 50 INJECTION, SOLUTION INTRAMUSCULAR; INTRAVENOUS at 10:31

## 2020-11-23 ASSESSMENT — PULMONARY FUNCTION TESTS
PIF_VALUE: 15
PIF_VALUE: 16
PIF_VALUE: 15
PIF_VALUE: 16
PIF_VALUE: 16
PIF_VALUE: 3
PIF_VALUE: 27
PIF_VALUE: 16
PIF_VALUE: 19
PIF_VALUE: 16
PIF_VALUE: 17
PIF_VALUE: 16
PIF_VALUE: 18
PIF_VALUE: 16
PIF_VALUE: 18
PIF_VALUE: 16
PIF_VALUE: 16
PIF_VALUE: 15
PIF_VALUE: 16
PIF_VALUE: 20
PIF_VALUE: 16
PIF_VALUE: 17
PIF_VALUE: 16
PIF_VALUE: 17
PIF_VALUE: 17
PIF_VALUE: 16
PIF_VALUE: 17
PIF_VALUE: 16
PIF_VALUE: 16
PIF_VALUE: 17
PIF_VALUE: 15
PIF_VALUE: 17
PIF_VALUE: 16
PIF_VALUE: 18
PIF_VALUE: 16
PIF_VALUE: 15
PIF_VALUE: 18
PIF_VALUE: 0
PIF_VALUE: 3
PIF_VALUE: 16
PIF_VALUE: 5
PIF_VALUE: 23
PIF_VALUE: 17
PIF_VALUE: 16
PIF_VALUE: 17
PIF_VALUE: 16
PIF_VALUE: 2
PIF_VALUE: 15
PIF_VALUE: 17
PIF_VALUE: 16
PIF_VALUE: 17
PIF_VALUE: 16
PIF_VALUE: 16
PIF_VALUE: 15
PIF_VALUE: 16
PIF_VALUE: 17
PIF_VALUE: 20
PIF_VALUE: 16
PIF_VALUE: 16
PIF_VALUE: 17
PIF_VALUE: 1
PIF_VALUE: 16
PIF_VALUE: 16
PIF_VALUE: 17
PIF_VALUE: 17
PIF_VALUE: 3
PIF_VALUE: 16
PIF_VALUE: 0
PIF_VALUE: 16
PIF_VALUE: 17
PIF_VALUE: 16
PIF_VALUE: 18
PIF_VALUE: 17
PIF_VALUE: 16
PIF_VALUE: 20
PIF_VALUE: 16
PIF_VALUE: 3
PIF_VALUE: 17
PIF_VALUE: 16
PIF_VALUE: 16
PIF_VALUE: 18
PIF_VALUE: 16
PIF_VALUE: 18
PIF_VALUE: 2
PIF_VALUE: 16
PIF_VALUE: 18
PIF_VALUE: 16

## 2020-11-23 ASSESSMENT — LIFESTYLE VARIABLES: SMOKING_STATUS: 0

## 2020-11-23 ASSESSMENT — PAIN - FUNCTIONAL ASSESSMENT: PAIN_FUNCTIONAL_ASSESSMENT: 0-10

## 2020-11-23 NOTE — OP NOTE
Dr. Geraldine Julian MD      [facility]. [facility]. [place]    DATE:  11/23/2020     SURGEON:   Geraldine Julian MD    ASSISTANT:  Tavo Navas MD    PREOPERATIVE DIAGNOSIS:  BLADDER TUMORS    POSTOPERATIVE DIAGNOSIS:  BLADDER TUMORS    PROCEDURE PERFORMED:  CYSTOSCOPY, TRANSURETHRAL RESECTION OF BLADDER TUMOR (8 CM) - LARGE  INSTILLATION OF MITOMYCIN C    ANESTHESIA:  General    COMPLICATIONS:  None    ESTIMATED BLOOD LOSS:  10 mL    SPECIMENS:  ID Type Source Tests Collected by Time Destination   A : BLADDER TUMOR Tissue Bladder SURGICAL PATHOLOGY Geraldine Julian MD 11/23/2020 1113       DRAINS:  25 Persian two-way Mathew catheter with 10 mL of sterile water in balloon    INDICATIONS FOR PROCEDURE:  The patient is a 81 y.o. female who presents with multifocal bladder tumors found on office cystoscopy. She presents today for TURBT and instillation of mitomycin-C. Risk, benefits, alternatives discussed with the patient and she elected to proceed. Informed consent was obtained. DETAILS OF THE PROCEDURE:  The patient was correctly identified in the preoperative holding area. She was brought back to the operating room and placed in the dorsal lithotomy position. EPC cuffs were on, in place, and fully functional. General endotracheal anesthesia was administered. She was given Ancef 2gm IV  for antibiotic prophylaxis. The patient was then prepped and draped in the usual sterile fashion. After appropriate time-out was performed with all parties agreeing, the visual obturator was inserted into the bladder. A thorough and complete cystoscopy was then performed which showed multifocal bladder tumor circumferentially around the bladder neck draping over the anterior wall, right lateral wall, floor, and posterior wall. The largest tumor was 8 cm in size, and all tumors appear to be high-grade. The ureteral orifices were patent in the orthotopic location.   The resectoscope was then inserted and used to resect the tumors. The resection did appear to obtain muscle grossly with no obvious extension into the muscle. There did not appear to be residual tumor. All bleeding areas were fulgurated and hemostasis was visualized. Tumor specimen was collected and sent for permanent specimen. The bladder was then drained and the cystoscope was removed. An 25 Greenlandic two-way Mathew catheter was placed with 10 mL of sterile water in balloon. 40 mg / 20 mL of mitomycin-C was prepared and instilled under hazard precautions into the bladder and catheter was capped. The patient tolerated the procedure well and was sent to PACU for postoperative monitoring. Dr. Misha Monroy was present and scrubbed for the duration of the procedure. DISPOSITION:  The patient will have mitomycin-C instilled for a time of 1 hour after which agent will be drained in the PACU.   She will be discharged with Mathew catheter for 24 hours and will follow-up in the office for removal.

## 2020-11-23 NOTE — H&P
H&P reviewed from 11/18/20 and patient seen and examined. There are no changes. Plan for OR for TURBT. Annalisa Sanchez MD-PGY4  11/23/20  ______________________________________________________________________    History and Physical     Pt Name: Amee Martino  MRN: [id number]  YOB: 1935  Date of evaluation: 11/18/2020     SUBJECTIVE:   History of Chief Complaint:    Patient complains of bladder tumors. She was experiencing hematuria, so sought evaluation. She had a cystoscopy and was found to have four \"small spots\" in the bladder. She has been diagnosed with bladder tumors, scheduled for cystoscopy and TURBT. Patient denies any further hematuria, only complains of her chronic urinary frequency which has not changed. Past Medical History    has a past medical history of Allergic rhinitis, Aortic stenosis, Arthritis, History of echocardiogram, History of stress test, Hyperlipidemia, Hypertension, Murmur, Thyroid disease, Wears prescription eyeglasses, and Wellness examination. Past Surgical History   has a past surgical history that includes shoulder surgery; Cataract removal; Colon surgery; Colonoscopy; Endoscopy, colon, diagnostic; ileostomy or jejunostomy; lumbar laminectomy (x 2); and Total knee arthroplasty (Bilateral). Medications  Home Medications           Prior to Admission medications    Medication Sig Start Date End Date Taking?  Authorizing Provider   loperamide (IMODIUM) 2 MG capsule Take 2 mg by mouth daily     Yes Historical Provider, MD   Probiotic Product (PROBIOTIC-10 PO) Take by mouth     Yes Historical Provider, MD   amLODIPine (NORVASC) 2.5 MG tablet Dr. Marisol Fiore 8/18/20   Yes Historical Provider, MD   cetirizine (ZYRTEC) 10 MG tablet Take 10 mg by mouth daily     Yes Historical Provider, MD   atorvastatin (LIPITOR) 10 MG tablet Take 10 mg by mouth daily Dr. Adrianne Laura Provider, MD   aspirin 81 MG tablet Take 81 mg by mouth daily Stopped 11/9/20 for surgery  Dr. Doc Grey prescribed     Yes Historical Provider, MD   Calcium Carbonate-Vit D-Min (CALCIUM 1200) 5550-9356 MG-UNIT CHEW daily       Historical Provider, MD   levothyroxine (SYNTHROID) 88 MCG tablet Take 88 mcg by mouth Daily Dr. Franci Valerio Provider, MD        Allergies  is allergic to augmentin [amoxicillin-pot clavulanate]; morphine; and tequin [gatifloxacin]. Family History  family history includes Diabetes in her mother. Social History   reports that she has quit smoking. She has never used smokeless tobacco.   reports current alcohol use of about 1.0 standard drinks of alcohol per week. reports no history of drug use. Marital Status   Occupation retired     OBJECTIVE:   VITALS:  height is 5' 5\" (1.651 m) and weight is 132 lb 1.3 oz (59.9 kg). Her temporal temperature is 96.8 °F (36 °C). Her blood pressure is 170/71 (abnormal) and her pulse is 54. Her respiration is 20 and oxygen saturation is 98%. CONSTITUTIONAL:alert & oriented x 3, no acute distress. Very pleasant. Looks younger than stated age. SKIN:  Warm and dry, no rashes on exposed areas of skin. HEAD:  Normocephalic, atraumatic. EYES: PERRL. EOMs intact. Wearing glasses. EARS:  Hearing grossly WNL. NOSE:  Nares patent. No rhinorrhea. MOUTH/THROAT:  benign  NECK:supple, no lymphadenopathy  LUNGS: Clear to auscultation bilaterally, no wheezes. CARDIOVASCULAR: RRR. Systolic murmur noted. ABDOMEN: soft, non tender, non distended. EXTREMITIES: trace edema bilateral lower extremities.     Testing:   EK20  Labs pending: drawn 2020      IMPRESSIONS:   1. Bladder tumors  2. Past Medical History in prose (no negatives)    has a past medical history of Allergic rhinitis, Aortic stenosis (2017), Arthritis, History of echocardiogram (2017), History of stress test (2017), Hyperlipidemia, Hypertension, Murmur, Thyroid disease, Wears prescription eyeglasses, and Wellness examination.       PLANS: 1. Cystoscopy, TURBT     MANUEL FOLEY PA-C  Electronically signed 11/18/2020 at 2:45 PM

## 2020-11-23 NOTE — PROGRESS NOTES
Catheter unclamped and drained into laguhlin bag and new laughlin bag added to allow bladder to drain  Disposed of urine and bag per protocol   VSS  No n/v  Appears comfortable  Does not speak unless spoken to   Report to phase 2 already sent  Await phase 2 bed

## 2020-11-23 NOTE — ANESTHESIA PRE PROCEDURE
Department of Anesthesiology  Preprocedure Note       Name:  Arcenio Muñoz   Age:  81 y.o.  :  1935                                          MRN:  [id number]         Date:  2020      Surgeon: Jeana Cui):  Marilee Ornelas MD    Procedure: Procedure(s):  CYSTOSCOPY TUR BLADDER TUMOR, GYRUS    Department of Anesthesiology  Pre-Anesthesia Evaluation/Consultation         Name:  Arcenio Muñoz                                         Age:  81 y.o. MRN:  [id number]             Medications  Current Facility-Administered Medications   Medication Dose Route Frequency Provider Last Rate Last Dose    ceFAZolin (ANCEF) 2 g in dextrose 5 % 50 mL IVPB  2 g Intravenous On Call to [address] [place] [address], MD        lactated ringers infusion 1,000 mL  1,000 mL Intravenous Continuous Tanja Mendez MD           Allergies   Allergen Reactions    Augmentin [Amoxicillin-Pot Clavulanate]     Morphine     Tequin [Gatifloxacin]      There is no problem list on file for this patient.     Past Medical History:   Diagnosis Date    Allergic rhinitis     Aortic stenosis 2017    on echo    Arthritis     rt foot    History of echocardiogram 2017    mild MR, aortic stenosis mild, mild TR, mild pulmonary hypertension    History of stress test 2017    \"normal\" per [address] [facility]    Hyperlipidemia     on rx    Hypertension     per Dr. Ospina [facility] Thyroid disease     Dr. Angel Rosario Wears prescription eyeglasses     Wellness examination     Dr. Wayman Schlatter last seen 10/2020     Past Surgical History:   Procedure Laterality Date    CATARACT REMOVAL      COLON SURGERY      \"intestines ruptured\"    COLONOSCOPY      within last 4 yrs    ENDOSCOPY, COLON, [address] approx 2017    ILEOSTOMY OR JEJUNOSTOMY      reversal 2001    LUMBAR LAMINECTOMY  x 2    2 lumbar laminectomy    SHOULDER SURGERY      TOTAL KNEE ARTHROPLASTY Bilateral      Social History     Tobacco Use    Smoking status: Former Smoker    Smokeless tobacco: Never Used    Tobacco comment: smoked for 2-3 years   Substance Use Topics    Alcohol use: Yes     Alcohol/week: 1.0 standard drinks     Types: 1 Glasses of wine per week     Comment: 1 glass of wine daily    Drug use: No         Vital Signs (Current)   Vitals:    20   BP: (!) 155/43   Pulse: 58   Resp: 16   Temp: 97.2 °F (36.2 °C)   SpO2: 100%     Vital Signs Statistics (for past 48 hrs)     Temp  Av.2 °F (36.2 °C)  Min: 97.2 °F (36.2 °C)   Min taken time: 20  Max: 97.2 °F (36.2 °C)   Max taken time: 20  Pulse  Av  Min: 62   Min taken time: 20  Max: 62   Max taken time: 20  Resp  Av  Min: 16   Min taken time: 20  Max: 12   Max taken time: 20  BP  Min: 155/43   Min taken time: 20  Max: 155/43   Max taken time: 20  SpO2  Av %  Min: 100 %   Min taken time: 20  Max: 100 %   Max taken time: 20  BP Readings from Last 3 Encounters:   20 (!) 155/43   20 (!) 170/71   09/15/20 (!) 156/85       BMI  Body mass index is 21.97 kg/m².     CBC   Lab Results   Component Value Date    WBC 7.1 2020    RBC 4.93 2020    RBC 4.14 10/19/2011    HGB 14.5 2020    HCT 45.6 2020    MCV 92.5 2020    RDW 13.6 2020     2020     10/19/2011       CMP    Lab Results   Component Value Date     2020    K 5.1 2020     2020    CO2 26 2020    BUN 18 2020    CREATININE 0.85 2020    GFRAA >60 2020    LABGLOM >60 2020    GLUCOSE 92 2020    GLUCOSE 123 10/19/2011    CALCIUM 8.7 09/15/2020    BILITOT 0.36 2012    ALKPHOS 61 2012    AST 27 2012    ALT 20 2012       BMP    Lab Results   Component Value Date     2020    K 5.1 2020     2020    CO2 26 2020    BUN 18 2020    CREATININE 0.85 11/18/2020    CALCIUM 8.7 09/15/2020    GFRAA >60 11/18/2020    LABGLOM >60 11/18/2020    GLUCOSE 92 11/18/2020    GLUCOSE 123 10/19/2011       POC Testing  No results for input(s): POCGLU, POCNA, POCK, POCCL, POCBUN, POCHEMO, POCHCT in the last 72 hours. Coags  No results found for: PROTIME, INR, APTT    HCG (If Applicable) No results found for: PREGTESTUR, PREGSERUM, HCG, HCGQUANT     ABGs No results found for: PHART, PO2ART, SRA9REE, SPX3KLB, BEART, V8NYKYWS     Type & Screen (If Applicable)  No results found for: LABABO, [address]    Radiology (If Applicable)    Cardiac Testing (If Applicable) neg stress 9080,HY 55%,mild AS,MR    EKG (If Applicable) SB,PAC,old septal MI          Medications prior to admission:   Prior to Admission medications    Medication Sig Start Date End Date Taking?  Authorizing Provider   Calcium Carbonate-Vit D-Min (CALCIUM 1200) 9633-2192 MG-UNIT CHEW daily   Yes Historical Provider, MD   Probiotic Product (PROBIOTIC-10 PO) Take by mouth   Yes Historical Provider, MD   amLODIPine (NORVASC) 2.5 MG tablet Dr. Epperson 8/18/20  Yes Historical Provider, MD   cetirizine (ZYRTEC) 10 MG tablet Take 10 mg by mouth daily   Yes Historical Provider, MD   atorvastatin (LIPITOR) 10 MG tablet Take 10 mg by mouth daily Dr. Hodan Garcia Provider, MD   levothyroxine (SYNTHROID) 88 MCG tablet Take 88 mcg by mouth Daily Dr. Hodan Garcia Provider, MD   loperamide (IMODIUM) 2 MG capsule Take 2 mg by mouth daily    Historical Provider, MD   aspirin 81 MG tablet Take 81 mg by mouth daily Stopped 11/9/20 for surgery  Dr. Epperson prescribed    Historical Provider, MD       Current medications:    Current Facility-Administered Medications   Medication Dose Route Frequency Provider Last Rate Last Dose    ceFAZolin (ANCEF) 2 g in dextrose 5 % 50 mL IVPB  2 g Intravenous On Call to [address] [place] [address] Avenue, MD        lactated ringers infusion 1,000 mL  1,000 mL Intravenous Continuous Samm ROMO Sanjay Eldridge MD           Allergies: Allergies   Allergen Reactions    Augmentin [Amoxicillin-Pot Clavulanate]     Morphine     Tequin [Gatifloxacin]        Problem List:  There is no problem list on file for this patient. Past Medical History:        Diagnosis Date    Allergic rhinitis     Aortic stenosis 2017    on echo    Arthritis     rt foot    History of echocardiogram 2017    mild MR, aortic stenosis mild, mild TR, mild pulmonary hypertension    History of stress test 2017    \"normal\" per [facility]    Hyperlipidemia     on rx    Hypertension     per Dr. Jaime American Thyroid disease     Dr. Moraima Carey Wears prescription eyeglasses     Wellness examination     Dr. Epperson last seen 10/2020       Past Surgical History:        Procedure Laterality Date    CATARACT REMOVAL      COLON SURGERY      \"intestines ruptured\"    COLONOSCOPY      within last 4 yrs    ENDOSCOPY, COLON, [address] approx 2017    ILEOSTOMY OR JEJUNOSTOMY      reversal 2001    LUMBAR LAMINECTOMY  x 2    2 lumbar laminectomy    SHOULDER SURGERY      TOTAL KNEE ARTHROPLASTY Bilateral        Social History:    Social History     Tobacco Use    Smoking status: Former Smoker    Smokeless tobacco: Never Used    Tobacco comment: smoked for 2-3 years   Substance Use Topics    Alcohol use:  Yes     Alcohol/week: 1.0 standard drinks     Types: 1 Glasses of wine per week     Comment: 1 glass of wine daily                                Counseling given: Not Answered  Comment: smoked for 2-3 years      Vital Signs (Current):   Vitals:    11/23/20 0920   BP: (!) 155/43   Pulse: 58   Resp: 16   Temp: 97.2 °F (36.2 °C)   TempSrc: Temporal   SpO2: 100%   Weight: 132 lb (59.9 kg)   Height: 5' 5\" (1.651 m)                                              BP Readings from Last 3 Encounters:   11/23/20 (!) 155/43   11/18/20 (!) 170/71   09/15/20 (!) 156/85       NPO Status: Time of last liquid consumption: 2358                        Time of last solid consumption: 1830                        Date of last liquid consumption: 11/22/20                        Date of last solid food consumption: 11/22/20    BMI:   Wt Readings from Last 3 Encounters:   11/23/20 132 lb (59.9 kg)   11/18/20 132 lb 1.3 oz (59.9 kg)   09/15/20 134 lb (60.8 kg)     Body mass index is 21.97 kg/m². CBC:   Lab Results   Component Value Date    WBC 7.1 11/18/2020    RBC 4.93 11/18/2020    RBC 4.14 10/19/2011    HGB 14.5 11/18/2020    HCT 45.6 11/18/2020    MCV 92.5 11/18/2020    RDW 13.6 11/18/2020     11/18/2020     10/19/2011       CMP:   Lab Results   Component Value Date     11/18/2020    K 5.1 11/18/2020     11/18/2020    CO2 26 11/18/2020    BUN 18 11/18/2020    CREATININE 0.85 11/18/2020    GFRAA >60 11/18/2020    LABGLOM >60 11/18/2020    GLUCOSE 92 11/18/2020    GLUCOSE 123 10/19/2011    CALCIUM 8.7 09/15/2020    BILITOT 0.36 06/12/2012    ALKPHOS 61 06/12/2012    AST 27 06/12/2012    ALT 20 06/12/2012       POC Tests: No results for input(s): POCGLU, POCNA, POCK, POCCL, POCBUN, POCHEMO, POCHCT in the last 72 hours.     Coags: No results found for: PROTIME, INR, APTT    HCG (If Applicable): No results found for: PREGTESTUR, PREGSERUM, HCG, HCGQUANT     ABGs: No results found for: PHART, PO2ART, DHH4IHO, HNP9FWA, BEART, Y5RBKREO     Type & Screen (If Applicable):  No results found for: LABABO, LABRH    Drug/Infectious Status (If Applicable):  No results found for: HIV, HEPCAB    COVID-19 Screening (If Applicable):   Lab Results   Component Value Date    COVID19 Not Detected 11/19/2020         Anesthesia Evaluation   no history of anesthetic complications:   Airway: Mallampati: II     Neck ROM: full   Dental:          Pulmonary:       (-) recent URI and not a current smoker                           Cardiovascular:  Exercise tolerance: good (>4 METS),   (+) hypertension:,                   Neuro/Psych:      (-) seizures and CVA            ROS comment: microvascular disease GI/Hepatic/Renal:        (-) GERD       Endo/Other:    (+) hypothyroidism::., .    (-) diabetes mellitus               Abdominal:           Vascular:           ROS comment: mi.                               Anesthesia Plan      general     ASA 3     (Asa 3 ET)  Induction: intravenous.                           Rosa M Zaragoza MD   11/23/2020

## 2020-11-23 NOTE — BRIEF OP NOTE
Brief Postoperative Note      Patient: Chris Mendieta  YOB: 1935  MRN: [id number]    Date of Procedure: 11/23/2020    Pre-Op Diagnosis: BLADDER TUMORS    Post-Op Diagnosis: Same       Procedure(s):  CYSTOSCOPY TUR BLADDER TUMOR, GYRUS, MITOMYCIN INSTILLATION       Surgeon(s):  Paco Bowles MD    Assistant:  Resident: Elisa Sarabia MD    Anesthesia: General    Estimated Blood Loss (mL): Minimal    Complications: None    Specimens:   ID Type Source Tests Collected by Time Destination   A : BLADDER TUMOR Tissue Bladder SURGICAL PATHOLOGY Paco Bowles MD 11/23/2020 1113        Implants:  * No implants in log *      Drains:   Urethral Catheter Double-lumen 18 fr (Active)       Findings: Multifocal high-grade disease    Electronically signed by Elisa Sarabia MD on 11/23/2020 at 11:41 AM

## 2020-11-23 NOTE — PROGRESS NOTES
Dr Chay Charlton updated on pt slow to answer questions although oriented x 4 says o to go to phase 2

## 2020-11-23 NOTE — PROGRESS NOTES
Dr Jesus Aiken here , says to drain laughlin at 1230 and to replace a new laughlin bag to go home with

## 2020-11-24 LAB — SURGICAL PATHOLOGY REPORT: NORMAL

## 2020-11-24 NOTE — ANESTHESIA POSTPROCEDURE EVALUATION
Department of Anesthesiology  Postprocedure Note    Patient: Kj Sevilla  MRN: [id number]  YOB: 1935  Date of evaluation: 11/24/2020  Time:  7:54 AM     Procedure Summary     Date:  11/23/20 Room / Location:  [address]    Anesthesia Start:  0394 Anesthesia Stop:  2027    Procedure:  CYSTOSCOPY TUR BLADDER TUMOR, GYRUS, MITOMYCIN INSTILLATION (N/A Bladder) Diagnosis:  (BLADDER TUMORS)    Surgeon:  Mark Benjamin MD Responsible Provider:  Harman Maxwell MD    Anesthesia Type:  general ASA Status:  3          Anesthesia Type: general    Collin Phase I: Collin Score: 10    Collin Phase II: Collin Score: 9    Last vitals: Reviewed and per EMR flowsheets.        Anesthesia Post Evaluation    Patient location during evaluation: PACU  Patient participation: complete - patient participated  Level of consciousness: awake  Pain score: 0  Nausea & Vomiting: no vomiting  Cardiovascular status: hemodynamically stable and hypertensive  Respiratory status: room air  Hydration status: stable

## 2020-12-07 PROBLEM — C67.8 MALIGNANT NEOPLASM OF OVERLAPPING SITES OF BLADDER (HCC): Status: ACTIVE | Noted: 2020-12-07

## 2021-01-11 ENCOUNTER — HOSPITAL ENCOUNTER (OUTPATIENT)
Age: 86
Setting detail: SPECIMEN
Discharge: HOME | End: 2021-01-11
Payer: MEDICARE

## 2021-01-13 LAB — SURGICAL PATHOLOGY REPORT: NORMAL

## 2021-06-26 ENCOUNTER — APPOINTMENT (OUTPATIENT)
Dept: CT IMAGING | Age: 86
DRG: 310 | End: 2021-06-26
Payer: MEDICARE

## 2021-06-26 ENCOUNTER — APPOINTMENT (OUTPATIENT)
Dept: GENERAL RADIOLOGY | Age: 86
DRG: 310 | End: 2021-06-26
Payer: MEDICARE

## 2021-06-26 ENCOUNTER — HOSPITAL ENCOUNTER (INPATIENT)
Age: 86
LOS: 1 days | Discharge: HOME | DRG: 310 | End: 2021-06-27
Attending: EMERGENCY MEDICINE | Admitting: INTERNAL MEDICINE
Payer: MEDICARE

## 2021-06-26 DIAGNOSIS — I48.91 ATRIAL FIBRILLATION WITH RVR (HCC): Primary | ICD-10-CM

## 2021-06-26 LAB
-: NORMAL
ABSOLUTE EOS #: 0.05 K/UL (ref 0–0.44)
ABSOLUTE IMMATURE GRANULOCYTE: 0.01 K/UL (ref 0–0.3)
ABSOLUTE LYMPH #: 1.03 K/UL (ref 1.1–3.7)
ABSOLUTE MONO #: 0.46 K/UL (ref 0.1–1.2)
ANION GAP SERPL CALCULATED.3IONS-SCNC: 12 MMOL/L (ref 9–17)
BASOPHILS # BLD: 1 % (ref 0–2)
BASOPHILS ABSOLUTE: 0.05 K/UL (ref 0–0.2)
BNP INTERPRETATION: ABNORMAL
BUN BLDV-MCNC: 11 MG/DL (ref 8–23)
BUN/CREAT BLD: 12 (ref 9–20)
CALCIUM SERPL-MCNC: 8.9 MG/DL (ref 8.6–10.4)
CHLORIDE BLD-SCNC: 106 MMOL/L (ref 98–107)
CO2: 23 MMOL/L (ref 20–31)
CREAT SERPL-MCNC: 0.93 MG/DL (ref 0.5–0.9)
D-DIMER QUANTITATIVE: 0.88 MG/L FEU (ref 0–0.59)
DIFFERENTIAL TYPE: ABNORMAL
EOSINOPHILS RELATIVE PERCENT: 1 % (ref 1–4)
GFR AFRICAN AMERICAN: >60 ML/MIN
GFR NON-AFRICAN AMERICAN: 57 ML/MIN
GFR SERPL CREATININE-BSD FRML MDRD: ABNORMAL ML/MIN/{1.73_M2}
GFR SERPL CREATININE-BSD FRML MDRD: ABNORMAL ML/MIN/{1.73_M2}
GLUCOSE BLD-MCNC: 104 MG/DL (ref 70–99)
HCT VFR BLD CALC: 45.5 % (ref 36.3–47.1)
HEMOGLOBIN: 14.6 G/DL (ref 11.9–15.1)
IMMATURE GRANULOCYTES: 0 %
LYMPHOCYTES # BLD: 14 % (ref 24–43)
MCH RBC QN AUTO: 30 PG (ref 25.2–33.5)
MCHC RBC AUTO-ENTMCNC: 32.1 G/DL (ref 28.4–34.8)
MCV RBC AUTO: 93.4 FL (ref 82.6–102.9)
MONOCYTES # BLD: 6 % (ref 3–12)
NRBC AUTOMATED: 0 PER 100 WBC
PDW BLD-RTO: 14.3 % (ref 11.8–14.4)
PLATELET # BLD: 207 K/UL (ref 138–453)
PLATELET ESTIMATE: ABNORMAL
PMV BLD AUTO: 10.9 FL (ref 8.1–13.5)
POTASSIUM SERPL-SCNC: 4.5 MMOL/L (ref 3.7–5.3)
PRO-BNP: 3147 PG/ML
RBC # BLD: 4.87 M/UL (ref 3.95–5.11)
RBC # BLD: ABNORMAL 10*6/UL
REASON FOR REJECTION: NORMAL
SEG NEUTROPHILS: 78 % (ref 36–65)
SEGMENTED NEUTROPHILS ABSOLUTE COUNT: 5.58 K/UL (ref 1.5–8.1)
SODIUM BLD-SCNC: 141 MMOL/L (ref 135–144)
TROPONIN INTERP: ABNORMAL
TROPONIN T: ABNORMAL NG/ML
TROPONIN, HIGH SENSITIVITY: 28 NG/L (ref 0–14)
TSH SERPL DL<=0.05 MIU/L-ACNC: 3.34 MIU/L (ref 0.3–5)
WBC # BLD: 7.2 K/UL (ref 3.5–11.3)
WBC # BLD: ABNORMAL 10*3/UL
ZZ NTE CLEAN UP: ORDERED TEST: NORMAL
ZZ NTE WITH NAME CLEAN UP: SPECIMEN SOURCE: NORMAL

## 2021-06-26 PROCEDURE — 85379 FIBRIN DEGRADATION QUANT: CPT

## 2021-06-26 PROCEDURE — 2580000003 HC RX 258: Performed by: INTERNAL MEDICINE

## 2021-06-26 PROCEDURE — 96375 TX/PRO/DX INJ NEW DRUG ADDON: CPT

## 2021-06-26 PROCEDURE — 85025 COMPLETE CBC W/AUTO DIFF WBC: CPT

## 2021-06-26 PROCEDURE — 2060000000 HC ICU INTERMEDIATE R&B

## 2021-06-26 PROCEDURE — 71045 X-RAY EXAM CHEST 1 VIEW: CPT

## 2021-06-26 PROCEDURE — 2500000003 HC RX 250 WO HCPCS: Performed by: EMERGENCY MEDICINE

## 2021-06-26 PROCEDURE — 2580000003 HC RX 258: Performed by: EMERGENCY MEDICINE

## 2021-06-26 PROCEDURE — 71260 CT THORAX DX C+: CPT

## 2021-06-26 PROCEDURE — 96372 THER/PROPH/DIAG INJ SC/IM: CPT

## 2021-06-26 PROCEDURE — 84484 ASSAY OF TROPONIN QUANT: CPT

## 2021-06-26 PROCEDURE — 83880 ASSAY OF NATRIURETIC PEPTIDE: CPT

## 2021-06-26 PROCEDURE — 99284 EMERGENCY DEPT VISIT MOD MDM: CPT

## 2021-06-26 PROCEDURE — 6360000002 HC RX W HCPCS: Performed by: EMERGENCY MEDICINE

## 2021-06-26 PROCEDURE — G0378 HOSPITAL OBSERVATION PER HR: HCPCS

## 2021-06-26 PROCEDURE — 6360000004 HC RX CONTRAST MEDICATION: Performed by: INTERNAL MEDICINE

## 2021-06-26 PROCEDURE — 80048 BASIC METABOLIC PNL TOTAL CA: CPT

## 2021-06-26 PROCEDURE — 96366 THER/PROPH/DIAG IV INF ADDON: CPT

## 2021-06-26 PROCEDURE — 84443 ASSAY THYROID STIM HORMONE: CPT

## 2021-06-26 PROCEDURE — 93005 ELECTROCARDIOGRAM TRACING: CPT | Performed by: EMERGENCY MEDICINE

## 2021-06-26 PROCEDURE — 96365 THER/PROPH/DIAG IV INF INIT: CPT

## 2021-06-26 PROCEDURE — 96376 TX/PRO/DX INJ SAME DRUG ADON: CPT

## 2021-06-26 RX ORDER — SODIUM CHLORIDE 9 MG/ML
INJECTION, SOLUTION INTRAVENOUS CONTINUOUS
Status: DISCONTINUED | OUTPATIENT
Start: 2021-06-26 | End: 2021-06-27

## 2021-06-26 RX ORDER — DILTIAZEM HYDROCHLORIDE 5 MG/ML
10 INJECTION INTRAVENOUS ONCE
Status: COMPLETED | OUTPATIENT
Start: 2021-06-26 | End: 2021-06-26

## 2021-06-26 RX ORDER — ONDANSETRON 2 MG/ML
4 INJECTION INTRAMUSCULAR; INTRAVENOUS EVERY 6 HOURS PRN
Status: DISCONTINUED | OUTPATIENT
Start: 2021-06-26 | End: 2021-06-27 | Stop reason: HOSPADM

## 2021-06-26 RX ORDER — ONDANSETRON 4 MG/1
4 TABLET, ORALLY DISINTEGRATING ORAL EVERY 8 HOURS PRN
Status: DISCONTINUED | OUTPATIENT
Start: 2021-06-26 | End: 2021-06-27 | Stop reason: HOSPADM

## 2021-06-26 RX ORDER — LOSARTAN POTASSIUM 25 MG/1
25 TABLET ORAL DAILY
Status: DISCONTINUED | OUTPATIENT
Start: 2021-06-26 | End: 2021-06-27 | Stop reason: HOSPADM

## 2021-06-26 RX ORDER — LOSARTAN POTASSIUM 25 MG/1
25 TABLET ORAL DAILY
Status: ON HOLD | COMMUNITY
End: 2022-03-10 | Stop reason: ALTCHOICE

## 2021-06-26 RX ORDER — SODIUM CHLORIDE 9 MG/ML
25 INJECTION, SOLUTION INTRAVENOUS PRN
Status: DISCONTINUED | OUTPATIENT
Start: 2021-06-26 | End: 2021-06-27 | Stop reason: HOSPADM

## 2021-06-26 RX ORDER — ASPIRIN 81 MG/1
81 TABLET ORAL DAILY
COMMUNITY

## 2021-06-26 RX ORDER — LEVOTHYROXINE SODIUM 0.1 MG/1
100 TABLET ORAL DAILY
Status: DISCONTINUED | OUTPATIENT
Start: 2021-06-27 | End: 2021-06-27 | Stop reason: HOSPADM

## 2021-06-26 RX ORDER — 0.9 % SODIUM CHLORIDE 0.9 %
80 INTRAVENOUS SOLUTION INTRAVENOUS ONCE
Status: COMPLETED | OUTPATIENT
Start: 2021-06-26 | End: 2021-06-26

## 2021-06-26 RX ORDER — SODIUM CHLORIDE 0.9 % (FLUSH) 0.9 %
5-40 SYRINGE (ML) INJECTION EVERY 12 HOURS SCHEDULED
Status: DISCONTINUED | OUTPATIENT
Start: 2021-06-26 | End: 2021-06-27 | Stop reason: HOSPADM

## 2021-06-26 RX ORDER — ACETAMINOPHEN 325 MG/1
650 TABLET ORAL EVERY 4 HOURS PRN
Status: DISCONTINUED | OUTPATIENT
Start: 2021-06-26 | End: 2021-06-27 | Stop reason: HOSPADM

## 2021-06-26 RX ORDER — SODIUM CHLORIDE 0.9 % (FLUSH) 0.9 %
5-40 SYRINGE (ML) INJECTION PRN
Status: DISCONTINUED | OUTPATIENT
Start: 2021-06-26 | End: 2021-06-27 | Stop reason: HOSPADM

## 2021-06-26 RX ORDER — SODIUM CHLORIDE 0.9 % (FLUSH) 0.9 %
10 SYRINGE (ML) INJECTION PRN
Status: DISCONTINUED | OUTPATIENT
Start: 2021-06-26 | End: 2021-06-27 | Stop reason: HOSPADM

## 2021-06-26 RX ADMIN — DILTIAZEM HYDROCHLORIDE 5 MG/HR: 5 INJECTION INTRAVENOUS at 11:45

## 2021-06-26 RX ADMIN — SODIUM CHLORIDE 80 ML: 9 INJECTION, SOLUTION INTRAVENOUS at 15:25

## 2021-06-26 RX ADMIN — SODIUM CHLORIDE: 9 INJECTION, SOLUTION INTRAVENOUS at 19:00

## 2021-06-26 RX ADMIN — SODIUM CHLORIDE 25 ML: 9 INJECTION, SOLUTION INTRAVENOUS at 13:44

## 2021-06-26 RX ADMIN — SODIUM CHLORIDE, PRESERVATIVE FREE 10 ML: 5 INJECTION INTRAVENOUS at 15:25

## 2021-06-26 RX ADMIN — IOPAMIDOL 75 ML: 755 INJECTION, SOLUTION INTRAVENOUS at 15:25

## 2021-06-26 RX ADMIN — ENOXAPARIN SODIUM 60 MG: 60 INJECTION SUBCUTANEOUS at 13:45

## 2021-06-26 RX ADMIN — DILTIAZEM HYDROCHLORIDE 10 MG: 5 INJECTION INTRAVENOUS at 11:09

## 2021-06-26 ASSESSMENT — ENCOUNTER SYMPTOMS
COLOR CHANGE: 0
APNEA: 0
EYES NEGATIVE: 1
SINUS PAIN: 0
VOMITING: 0
CONSTIPATION: 0
DIARRHEA: 0
CHEST TIGHTNESS: 1
ABDOMINAL DISTENTION: 0
SHORTNESS OF BREATH: 1

## 2021-06-26 ASSESSMENT — PAIN SCALES - GENERAL: PAINLEVEL_OUTOF10: 5

## 2021-06-26 NOTE — ED PROVIDER NOTES
EMERGENCY DEPARTMENT ENCOUNTER    Pt Name: Idania Hayes  MRN: [id number]  Bruno 1935  Date of evaluation: 6/26/21  CHIEF COMPLAINT       Chief Complaint   Patient presents with    Chest Pain    Cough    Shortness of Breath     HISTORY OF PRESENT ILLNESS   30-year-old female presenting to the emergency room for generalized weakness/ malaise chest tightness and shortness of breath. Symptoms started earlier today. Patient states that she felt weakness and just not herself. She states that she had some mild increased difficulty breathing. Patient has no cardiopulmonary history that she is aware of. She had her daughter-in-law examine her and her heart rate was elevated and her daughter-in-law told her she should come to the emergency room. No recent illness the patient is aware of. No cough or fever. REVIEW OF SYSTEMS     Review of Systems   Constitutional: Negative for activity change, chills and diaphoresis. HENT: Negative for congestion, sinus pain and tinnitus. Eyes: Negative. Respiratory: Positive for chest tightness and shortness of breath. Negative for apnea. Gastrointestinal: Negative for abdominal distention, constipation, diarrhea and vomiting. Genitourinary: Negative for difficulty urinating and frequency. Musculoskeletal: Negative for arthralgias and myalgias. Skin: Negative for color change and rash. Neurological: Positive for light-headedness. Negative for dizziness. Hematological: Negative. Psychiatric/Behavioral: Negative.         PASTMEDICAL HISTORY     Past Medical History:   Diagnosis Date    Allergic rhinitis     Aortic stenosis 2017    on echo    Arthritis     rt foot    History of echocardiogram 2017    mild MR, aortic stenosis mild, mild TR, mild pulmonary hypertension    History of stress test 2017    \"normal\" per [facility]    Hyperlipidemia     on rx    Hypertension     per Dr. Clover Sparrow Thyroid disease      Juan Perea Wears prescription eyeglasses     Wellness examination     Dr. Epperson last seen 10/2020     Past Problem List  Patient Active Problem List   Diagnosis Code    Malignant neoplasm of overlapping sites of bladder ([facility] C67.8    Atrial fibrillation with RVR ([facility] [address] I48.91     SURGICAL HISTORY       Past Surgical History:   Procedure Laterality Date    CATARACT REMOVAL      COLON SURGERY      \"intestines ruptured\"    COLONOSCOPY      within last 4 yrs    CYSTOSCOPY  2020    TUR-BT, MITOMYCIN INSTILLATION     CYSTOSCOPY N/A 2020    CYSTOSCOPY TUR BLADDER TUMOR, GYRUS, MITOMYCIN INSTILLATION performed by Moise England MD at [address] [facility], [address] [facility] approx 2017    ILEOSTOMY OR JEJUNOSTOMY      reversal     LUMBAR LAMINECTOMY  x 2    2 lumbar laminectomy    SHOULDER SURGERY      TOTAL KNEE ARTHROPLASTY Bilateral      CURRENT MEDICATIONS       Current Discharge Medication List      CONTINUE these medications which have NOT CHANGED    Details   losartan (COZAAR) 25 MG tablet Take 25 mg by mouth daily      aspirin 81 MG EC tablet Take 81 mg by mouth daily      loperamide (IMODIUM) 2 MG capsule Take 2 mg by mouth 4 times daily as needed for Diarrhea       Calcium Carbonate-Vit D-Min (CALCIUM 1200) 8086-2520 MG-UNIT CHEW Take 1 tablet by mouth daily daily      Probiotic Product (PROBIOTIC-10 PO) Take 1 capsule by mouth daily       amLODIPine (NORVASC) 2.5 MG tablet Take 2.5 mg by mouth daily Dr. Epperson      cetirizine (ZYRTEC) 10 MG tablet Take 10 mg by mouth daily      atorvastatin (LIPITOR) 10 MG tablet Take 10 mg by mouth daily Dr. Epperson      levothyroxine (SYNTHROID) 100 MCG tablet Take 100 mcg by mouth Daily Dr. Nelly Fernandez     is allergic to augmentin [amoxicillin-pot clavulanate], morphine, and tequin [gatifloxacin]. FAMILY HISTORY     She indicated that her mother is . She indicated that her father is .      SOCIAL HISTORY Social History     Tobacco Use    Smoking status: Former Smoker    Smokeless tobacco: Never Used    Tobacco comment: smoked for 2-3 years   Vaping Use    Vaping Use: Never used   Substance Use Topics    Alcohol use: Yes     Alcohol/week: 3.0 standard drinks     Types: 3 Glasses of wine per week     Comment: 3 GLASSES OF WINE PER DAY    Drug use: No     PHYSICAL EXAM     INITIAL VITALS: BP (!) 120/49   Pulse (!) 46   Temp 97.9 °F (36.6 °C) (Oral)   Resp 14   Ht 5' 5\" (1.651 m)   Wt 133 lb (60.3 kg)   SpO2 98%   BMI 22.13 kg/m²    Physical Exam  Constitutional:       General: She is not in acute distress. Appearance: She is well-developed. HENT:      Head: Normocephalic. Eyes:      Pupils: Pupils are equal, round, and reactive to light. Cardiovascular:      Rate and Rhythm: Tachycardia present. Rhythm irregularly irregular. Heart sounds: Normal heart sounds. Pulmonary:      Effort: Pulmonary effort is normal. No respiratory distress. Breath sounds: Normal breath sounds. Abdominal:      General: Bowel sounds are normal.      Palpations: Abdomen is soft. Tenderness: There is no abdominal tenderness. Musculoskeletal:         General: Normal range of motion. Skin:     General: Skin is warm and dry. Neurological:      Mental Status: She is alert and oriented to person, place, and time. MEDICAL DECISION MAKIN-year-old female presenting to the emergency room with atrial fibrillation with rapid ventricular response. Heart rate controlled at this time with Cardizem here in the ED. clinically patient is overall stable appearing. She is in no significant distress. Patient admitted to Dr. Nelson Molina with cardiology consult. ED Course as of 2202   Sat 2021   1322 Case discussed with Dr. Nelson Molina and Dr. Roxann Christensen for admission. Patient on cardizem drip at this time.    Per Dr. Roxann Christensen ok for lovenox    [EG]      ED Course User Index  [EG] 0.93 (*)     GFR Non- 57 (*)     All other components within normal limits   BRAIN NATRIURETIC PEPTIDE - Abnormal; Notable for the following components:    Pro-BNP 3,147 (*)     All other components within normal limits   TROPONIN - Abnormal; Notable for the following components:    Troponin, High Sensitivity 28 (*)     All other components within normal limits   D-DIMER, QUANTITATIVE - Abnormal; Notable for the following components:    D-Dimer, Quant 0.88 (*)     All other components within normal limits   SPECIMEN REJECTION   TSH WITH REFLEX       EMERGENCY DEPARTMENTCOURSE:         Vitals:    Vitals:    06/26/21 1430 06/26/21 1500 06/26/21 1542 06/26/21 1949   BP: (!) 95/59 (!) 94/51 119/82 (!) 120/49   Pulse: 127 100 112 (!) 46   Resp: 21 23 16 14   Temp:   97.9 °F (36.6 °C) 97.9 °F (36.6 °C)   TempSrc:   Oral Oral   SpO2: 98% 96% 96% 98%   Weight:       Height:           The patient was given the following medications while in the emergency department:  Orders Placed This Encounter   Medications    dilTIAZem injection 10 mg    dilTIAZem 125 mg in dextrose 5 % 125 mL infusion    enoxaparin (LOVENOX) injection 60 mg    sodium chloride flush 0.9 % injection 5-40 mL    sodium chloride flush 0.9 % injection 5-40 mL    0.9 % sodium chloride infusion    acetaminophen (TYLENOL) tablet 650 mg    OR Linked Order Group     ondansetron (ZOFRAN-ODT) disintegrating tablet 4 mg     ondansetron (ZOFRAN) injection 4 mg    enoxaparin (LOVENOX) injection 60 mg    levothyroxine (SYNTHROID) tablet 100 mcg    losartan (COZAAR) tablet 25 mg    0.9 % sodium chloride infusion    iopamidol (ISOVUE-370) 76 % injection 75 mL    sodium chloride flush 0.9 % injection 10 mL    0.9 % sodium chloride bolus     CONSULTS:  IP CONSULT TO INTERNAL MEDICINE  IP CONSULT TO CARDIOLOGY    FINAL IMPRESSION      1.  Atrial fibrillation with RVR ([facility] [address]          DISPOSITION/PLAN   DISPOSITION Admitted 06/26/2021 01:21:20 PM      PATIENT REFERRED TO:  No follow-up provider specified.   DISCHARGE MEDICATIONS:  Current Discharge Medication List        Jaclyn Dasilva MD  Attending Emergency Physician                 Steph Steel MD  06/26/21 8031

## 2021-06-26 NOTE — PROGRESS NOTES
Admitted from ER /PCU to room 1002-2. Pt alert and oriented. Pt oriented to call light system and agreeable to call for assistance. Family at bed side. Admission documentation completed  Flu shot not ordered. Pneumonia shot not indicated. Pt is nonsmoker.

## 2021-06-26 NOTE — PROGRESS NOTES
Transitions of Care Pharmacy Service   Medication Review    The patient's list of current home medications has been reviewed and updated. Source(s) of information: Patient/Patient Caregiver/Patient medication list/Sure Scripts    Please feel free to call with any questions about this encounter. Thank you. Jina Hashimoto, [facility]  Transitions of Care Pharmacy Service  Phone:  [phone]  Fax: [phone]      Prior to Admission medications    Medication Sig Start Date End Date Taking?  Authorizing Provider   losartan (COZAAR) 25 MG tablet Take 25 mg by mouth daily   Yes Historical Provider, MD   aspirin 81 MG EC tablet Take 81 mg by mouth daily   Yes Historical Provider, MD   loperamide (IMODIUM) 2 MG capsule Take 2 mg by mouth 4 times daily as needed for Diarrhea    Yes Historical Provider, MD   Calcium Carbonate-Vit D-Min (CALCIUM 1200) 4900-6604 MG-UNIT CHEW Take 1 tablet by mouth daily daily   Yes Historical Provider, MD   Probiotic Product (PROBIOTIC-10 PO) Take 1 capsule by mouth daily    Yes Historical Provider, MD   amLODIPine (NORVASC) 2.5 MG tablet Take 2.5 mg by mouth daily Dr. Epperson 8/18/20  Yes Historical Provider, MD   cetirizine (ZYRTEC) 10 MG tablet Take 10 mg by mouth daily   Yes Historical Provider, MD   atorvastatin (LIPITOR) 10 MG tablet Take 10 mg by mouth daily Dr. Epperson   Yes Historical Provider, MD   levothyroxine (SYNTHROID) 100 MCG tablet Take 100 mcg by mouth Daily Dr. Logan Roger Provider, MD

## 2021-06-26 NOTE — FLOWSHEET NOTE
Writer visits patient and daughter-in-law per referral from [facility]. Patient is in the ED and writer speaks to patient's daughter-in-law in the ED waiting area. Writer speaks to patient and daughter-in-law in room #15 also. Patient appears to be coping appropriately. Writer offers presence, prayer, and supportive conversation. Patient and family express appreciation for the visit. Spiritual Care will follow s needed.        06/26/21 1100   Encounter Summary   Services provided to: Patient and family together   Referral/Consult From: Other    Support System Children;Family members   Continue Visiting   (6/26/21)   Complexity of Encounter Moderate   Length of Encounter 30 minutes   Spiritual Assessment Completed Yes   Routine   Type Initial   Assessment Approachable   Intervention Active listening;Explored feelings, thoughts, concerns;Explored coping resources;Nurtured hope;Prayer   Outcome Expressed gratitude

## 2021-06-27 ENCOUNTER — APPOINTMENT (OUTPATIENT)
Dept: GENERAL RADIOLOGY | Age: 86
DRG: 310 | End: 2021-06-27
Payer: MEDICARE

## 2021-06-27 VITALS
DIASTOLIC BLOOD PRESSURE: 54 MMHG | WEIGHT: 133 LBS | BODY MASS INDEX: 22.16 KG/M2 | RESPIRATION RATE: 18 BRPM | SYSTOLIC BLOOD PRESSURE: 140 MMHG | HEIGHT: 65 IN | HEART RATE: 59 BPM | TEMPERATURE: 98.4 F | OXYGEN SATURATION: 98 %

## 2021-06-27 LAB
ABSOLUTE EOS #: 0.09 K/UL (ref 0–0.44)
ABSOLUTE IMMATURE GRANULOCYTE: 0.01 K/UL (ref 0–0.3)
ABSOLUTE LYMPH #: 1.46 K/UL (ref 1.1–3.7)
ABSOLUTE MONO #: 0.49 K/UL (ref 0.1–1.2)
ANION GAP SERPL CALCULATED.3IONS-SCNC: 11 MMOL/L (ref 9–17)
BASOPHILS # BLD: 1 % (ref 0–2)
BASOPHILS ABSOLUTE: 0.07 K/UL (ref 0–0.2)
BUN BLDV-MCNC: 14 MG/DL (ref 8–23)
BUN/CREAT BLD: 13 (ref 9–20)
CALCIUM SERPL-MCNC: 8 MG/DL (ref 8.6–10.4)
CHLORIDE BLD-SCNC: 111 MMOL/L (ref 98–107)
CO2: 21 MMOL/L (ref 20–31)
CREAT SERPL-MCNC: 1.1 MG/DL (ref 0.5–0.9)
DIFFERENTIAL TYPE: ABNORMAL
EOSINOPHILS RELATIVE PERCENT: 2 % (ref 1–4)
GFR AFRICAN AMERICAN: 57 ML/MIN
GFR NON-AFRICAN AMERICAN: 47 ML/MIN
GFR SERPL CREATININE-BSD FRML MDRD: ABNORMAL ML/MIN/{1.73_M2}
GFR SERPL CREATININE-BSD FRML MDRD: ABNORMAL ML/MIN/{1.73_M2}
GLUCOSE BLD-MCNC: 149 MG/DL (ref 70–99)
HCT VFR BLD CALC: 38 % (ref 36.3–47.1)
HEMOGLOBIN: 12.1 G/DL (ref 11.9–15.1)
IMMATURE GRANULOCYTES: 0 %
LYMPHOCYTES # BLD: 30 % (ref 24–43)
MAGNESIUM: 1.8 MG/DL (ref 1.6–2.6)
MCH RBC QN AUTO: 30 PG (ref 25.2–33.5)
MCHC RBC AUTO-ENTMCNC: 31.8 G/DL (ref 28.4–34.8)
MCV RBC AUTO: 94.3 FL (ref 82.6–102.9)
MONOCYTES # BLD: 10 % (ref 3–12)
MYOGLOBIN: 41 NG/ML (ref 25–58)
NRBC AUTOMATED: 0 PER 100 WBC
PDW BLD-RTO: 14.5 % (ref 11.8–14.4)
PLATELET # BLD: 190 K/UL (ref 138–453)
PLATELET ESTIMATE: ABNORMAL
PMV BLD AUTO: 10.9 FL (ref 8.1–13.5)
POTASSIUM SERPL-SCNC: 3.8 MMOL/L (ref 3.7–5.3)
RBC # BLD: 4.03 M/UL (ref 3.95–5.11)
RBC # BLD: ABNORMAL 10*6/UL
SEG NEUTROPHILS: 57 % (ref 36–65)
SEGMENTED NEUTROPHILS ABSOLUTE COUNT: 2.82 K/UL (ref 1.5–8.1)
SODIUM BLD-SCNC: 143 MMOL/L (ref 135–144)
TROPONIN INTERP: ABNORMAL
TROPONIN T: ABNORMAL NG/ML
TROPONIN, HIGH SENSITIVITY: 34 NG/L (ref 0–14)
WBC # BLD: 4.9 K/UL (ref 3.5–11.3)
WBC # BLD: ABNORMAL 10*3/UL

## 2021-06-27 PROCEDURE — 83735 ASSAY OF MAGNESIUM: CPT

## 2021-06-27 PROCEDURE — 36415 COLL VENOUS BLD VENIPUNCTURE: CPT

## 2021-06-27 PROCEDURE — 6370000000 HC RX 637 (ALT 250 FOR IP): Performed by: INTERNAL MEDICINE

## 2021-06-27 PROCEDURE — 6360000002 HC RX W HCPCS: Performed by: ORTHOPAEDIC SURGERY

## 2021-06-27 PROCEDURE — 96372 THER/PROPH/DIAG INJ SC/IM: CPT

## 2021-06-27 PROCEDURE — 85025 COMPLETE CBC W/AUTO DIFF WBC: CPT

## 2021-06-27 PROCEDURE — 80048 BASIC METABOLIC PNL TOTAL CA: CPT

## 2021-06-27 PROCEDURE — 6360000002 HC RX W HCPCS: Performed by: INTERNAL MEDICINE

## 2021-06-27 PROCEDURE — 2580000003 HC RX 258: Performed by: INTERNAL MEDICINE

## 2021-06-27 PROCEDURE — 2500000003 HC RX 250 WO HCPCS: Performed by: ORTHOPAEDIC SURGERY

## 2021-06-27 PROCEDURE — 84484 ASSAY OF TROPONIN QUANT: CPT

## 2021-06-27 PROCEDURE — 83874 ASSAY OF MYOGLOBIN: CPT

## 2021-06-27 PROCEDURE — G0378 HOSPITAL OBSERVATION PER HR: HCPCS

## 2021-06-27 PROCEDURE — 20610 DRAIN/INJ JOINT/BURSA W/O US: CPT | Performed by: ORTHOPAEDIC SURGERY

## 2021-06-27 PROCEDURE — 73010 X-RAY EXAM OF SHOULDER BLADE: CPT

## 2021-06-27 PROCEDURE — 99231 SBSQ HOSP IP/OBS SF/LOW 25: CPT | Performed by: ORTHOPAEDIC SURGERY

## 2021-06-27 PROCEDURE — 96366 THER/PROPH/DIAG IV INF ADDON: CPT

## 2021-06-27 RX ORDER — LIDOCAINE HYDROCHLORIDE 10 MG/ML
5 INJECTION, SOLUTION INFILTRATION; PERINEURAL ONCE
Status: COMPLETED | OUTPATIENT
Start: 2021-06-27 | End: 2021-06-27

## 2021-06-27 RX ORDER — METOPROLOL SUCCINATE 25 MG/1
12.5 TABLET, EXTENDED RELEASE ORAL DAILY
Status: DISCONTINUED | OUTPATIENT
Start: 2021-06-27 | End: 2021-06-27 | Stop reason: HOSPADM

## 2021-06-27 RX ORDER — METOPROLOL SUCCINATE 25 MG/1
12.5 TABLET, EXTENDED RELEASE ORAL DAILY
Qty: 15 TABLET | Refills: 0 | Status: SHIPPED | OUTPATIENT
Start: 2021-06-27 | End: 2022-03-10

## 2021-06-27 RX ORDER — METHYLPREDNISOLONE ACETATE 40 MG/ML
40 INJECTION, SUSPENSION INTRA-ARTICULAR; INTRALESIONAL; INTRAMUSCULAR; SOFT TISSUE ONCE
Status: COMPLETED | OUTPATIENT
Start: 2021-06-27 | End: 2021-06-27

## 2021-06-27 RX ADMIN — SODIUM CHLORIDE: 9 INJECTION, SOLUTION INTRAVENOUS at 05:50

## 2021-06-27 RX ADMIN — METHYLPREDNISOLONE ACETATE 40 MG: 40 INJECTION, SUSPENSION INTRA-ARTICULAR; INTRALESIONAL; INTRAMUSCULAR; SOFT TISSUE at 11:08

## 2021-06-27 RX ADMIN — LEVOTHYROXINE SODIUM 100 MCG: 0.1 TABLET ORAL at 06:00

## 2021-06-27 RX ADMIN — LIDOCAINE HYDROCHLORIDE 5 ML: 10 INJECTION, SOLUTION EPIDURAL; INFILTRATION; INTRACAUDAL; PERINEURAL at 11:07

## 2021-06-27 RX ADMIN — ENOXAPARIN SODIUM 60 MG: 60 INJECTION SUBCUTANEOUS at 09:11

## 2021-06-27 RX ADMIN — METOPROLOL SUCCINATE 12.5 MG: 25 TABLET, EXTENDED RELEASE ORAL at 15:23

## 2021-06-27 RX ADMIN — LOSARTAN POTASSIUM 25 MG: 25 TABLET, FILM COATED ORAL at 09:11

## 2021-06-27 ASSESSMENT — PAIN SCALES - GENERAL
PAINLEVEL_OUTOF10: 2
PAINLEVEL_OUTOF10: 2
PAINLEVEL_OUTOF10: 0

## 2021-06-27 ASSESSMENT — PAIN DESCRIPTION - PAIN TYPE: TYPE: ACUTE PAIN

## 2021-06-27 ASSESSMENT — PAIN DESCRIPTION - ORIENTATION: ORIENTATION: RIGHT

## 2021-06-27 ASSESSMENT — PAIN DESCRIPTION - LOCATION: LOCATION: SHOULDER

## 2021-06-27 NOTE — FLOWSHEET NOTE
Dr Elina Heard in to see patient, updated, orders received and rt shoulder injected at bedside, patient tolerated well

## 2021-06-27 NOTE — FLOWSHEET NOTE
Patient taken to front entrance via wheelchair with personal belongings and discharged home with grandgibran

## 2021-06-27 NOTE — PLAN OF CARE
Problem: Falls - Risk of:  Goal: Will remain free from falls  Description: Will remain free from falls  Outcome: Ongoing   Bed alarm on, non-slip socks on, bed rails up x2, call light in reach  Problem: Cardiac:  Goal: Ability to maintain vital signs within normal range will improve  Description: Ability to maintain vital signs within normal range will improve  Outcome: Ongoing   Monitor HR and other vitals closely   Problem: Cardiac:  Goal: Cardiovascular alteration will improve  Description: Cardiovascular alteration will improve  Outcome: Ongoing

## 2021-06-27 NOTE — FLOWSHEET NOTE
Dr Mendoza Olvera in to see patient,states patient can be discharged from cardiology perspective with outpatient follow-up

## 2021-06-27 NOTE — CONSULTS
Cardiology consult dictated. Impression:    1. Paroxysmal atrial fibrillation. 2.  Hypertension. 3.  Dyslipidemia    Plan:    Patient converted to sinus rhythm. I will start patient on metoprolol succinate 12.5 mg daily. Stop Norvasc. Continue losartan for hypertension. I discussed the need for anticoagulation in view of high CHADS2 score I recommended rivaroxaban 20 mg daily for thromboprophylaxis of atrial fibrillation. Patient may be discharged home when okay with all services and I will arrange for outpatient cardiology follow-up. Thank you.

## 2021-06-27 NOTE — FLOWSHEET NOTE
Call received from Dr Pedro Heard, states that he has reviewed labs and noted elevated creatinine and trops, requests that Dr Romana Duval be notified, informed of labs and see if he is ok with patient being discharged, message sent via [address] [place] [address]

## 2021-06-27 NOTE — H&P
History and Physical/ admit note              CHIEF COMPLAINT: Palpitations tightness    History of Present Illness: 81year-old gentlewoman recent travel from [facility] comes in with palpitations fatigue dizziness and chest tightness.   In the emergency room she was found to have atrial fibrillation with rapid ventricular response D-dimer was elevated CTA was done did not show any pulmonary emboli, she was started on Lovenox 1 mg/kg body weight twice a day and IV Cardizem and admitted to the [facility] area on a monitor  Past Medical History:   Diagnosis Date    Allergic rhinitis     Aortic stenosis 2017    on echo    Arthritis     rt foot    History of echocardiogram 2017    mild MR, aortic stenosis mild, mild TR, mild pulmonary hypertension    History of stress test 2017    \"normal\" per [address] [facility]    Hyperlipidemia     on rx    Hypertension     per Dr. Pamela Recio Thyroid disease     Dr. Yelena Carmen Wears prescription eyeglasses     Wellness examination     Dr. Marina Arredondo last seen 10/2020         Past Surgical History:   Procedure Laterality Date    CATARACT REMOVAL      COLON SURGERY      \"intestines ruptured\"    COLONOSCOPY      within last 4 yrs    CYSTOSCOPY  11/23/2020    TUR-BT, MITOMYCIN INSTILLATION     CYSTOSCOPY N/A 11/23/2020    CYSTOSCOPY TUR BLADDER TUMOR, GYRUS, MITOMYCIN INSTILLATION performed by Susannah Parham MD at [address], [place], [address] [facility] approx 2017    ILEOSTOMY OR JEJUNOSTOMY      reversal 2001    LUMBAR LAMINECTOMY  x 2    2 lumbar laminectomy    SHOULDER SURGERY      TOTAL KNEE ARTHROPLASTY Bilateral        Medications Prior to Admission:    Medications Prior to Admission: losartan (COZAAR) 25 MG tablet, Take 25 mg by mouth daily  aspirin 81 MG EC tablet, Take 81 mg by mouth daily  loperamide (IMODIUM) 2 MG capsule, Take 2 mg by mouth 4 times daily as needed for Diarrhea   Calcium Carbonate-Vit D-Min (CALCIUM 1200) 0285-8189 MG-UNIT CHEW, Take 1 tablet by mouth daily daily  Probiotic Product (PROBIOTIC-10 PO), Take 1 capsule by mouth daily   amLODIPine (NORVASC) 2.5 MG tablet, Take 2.5 mg by mouth daily Dr. Marky Reynolds  cetirizine (ZYRTEC) 10 MG tablet, Take 10 mg by mouth daily  atorvastatin (LIPITOR) 10 MG tablet, Take 10 mg by mouth daily Dr. Marky Reynolds  levothyroxine (SYNTHROID) 100 MCG tablet, Take 100 mcg by mouth Daily Dr. Peña Salvage:    Augmentin [amoxicillin-pot clavulanate], Morphine, and Tequin [gatifloxacin]    Social History:    reports that she has quit smoking. She has never used smokeless tobacco. She reports current alcohol use of about 3.0 standard drinks of alcohol per week. She reports that she does not use drugs. Family History:   family history includes Diabetes in her mother.     REVIEW OF SYSTEMS:    Constitutional: negative, No fever no chills  Eyes: negative  Ears, nose, mouth, throat, and face: negative  Respiratory: negative, No cough no shortness of breath no wheezing  Cardiovascular: negative, Positive palpitations chest tightness no angina  Gastrointestinal: negative  Genitourinary:negative  Integument/breast: negative  Hematologic/lymphatic: negative  Musculoskeletal:negative  Neurological: negative  Behavioral/Psych: negative  Endocrine: negative  Allergic/Immunologic: negative   Musculoskeletal addendum complaint of right shoulder pain for months PHYSICAL EXAM:  General Appearance: in no acute distress and alert  Skin: warm and dry, no rash or erythema  Head: normocephalic and atraumatic  Eyes: pupils equal, round, and reactive to light, conjunctivae normal and sclera anicteric  Neck: neck supple and non tender without mass bilateral bruits pulmonary/Chest: clear to auscultation bilaterally- no wheezes, rales or rhonchi, normal air movement, no respiratory distress  Cardiovascular: normal rate, regular rhythm, normal S1 and S2, no gallops, intact distal pulses systolic murmur aortic area  Abdomen: soft, non-tender, non-distended, normal bowel sounds, no masses or organomegaly    Extremities: no edema and good pulses no Homans  Musculoskeletal: Right shoulder mild deformity limited abduction to 40 degrees  Neurologic: Alert oriented x3 with no focal deficit    Vitals:  BP (!) 144/55   Pulse (!) 48   Temp 98.8 °F (37.1 °C) (Oral)   Resp 18   Ht 5' 5\" (1.651 m)   Wt 133 lb (60.3 kg)   SpO2 96%   BMI 22.13 kg/m²     LABS:  CBC:   Lab Results   Component Value Date    WBC 7.2 06/26/2021    RBC 4.87 06/26/2021    RBC 4.14 10/19/2011    HGB 14.6 06/26/2021    HCT 45.5 06/26/2021    MCV 93.4 06/26/2021    MCH 30.0 06/26/2021    MCHC 32.1 06/26/2021    RDW 14.3 06/26/2021     06/26/2021     10/19/2011    MPV 10.9 06/26/2021     CMP:    Lab Results   Component Value Date     06/26/2021    K 4.5 06/26/2021     06/26/2021    CO2 23 06/26/2021    BUN 11 06/26/2021    CREATININE 0.93 06/26/2021    GFRAA >60 06/26/2021    LABGLOM 57 06/26/2021    GLUCOSE 104 06/26/2021    GLUCOSE 123 10/19/2011    LABALBU 4.2 06/12/2012    CALCIUM 8.9 06/26/2021    BILITOT 0.36 06/12/2012    ALKPHOS 61 06/12/2012    AST 27 06/12/2012    ALT 20 06/12/2012         ASSESSMENT:    A. fib with rapid ventricular response  Right rotator cuff tear  Hypertension  Hypothyroid  Bilateral carotid bruits  Systolic aortic murmur  Renal insufficiency  Patient Active Problem List   Diagnosis    Malignant neoplasm of overlapping sites of bladder ([address]    Atrial fibrillation with RVR ([address]       PLAN:    Patient is back in sinus rhythm off Cardizem drip will switch to Eliquis has seen orthopedic and had injection into the shoulder we will check EKG check thyroid check thyroid and cardiac enzymes ambulate will see if we can discharge  today if okay with cardiology have the carotid ultrasound and the 2D echo as an outpatient, side effects precaution on anticoagulation advised up with rome Botello MD MD  11:24 AM  6/27/2021

## 2021-06-27 NOTE — CARE COORDINATION
Case Management Initial Discharge Plan  Jesse Santana,         Readmission Risk              Risk of Unplanned Readmission:  12             Met with:patient to discuss discharge plans. Information verified: address, contacts, phone number, , insurance Yes  PCP: Greg Avitia MD  Date of last visit: doesn't remember     Insurance Provider: Seymour Keenan    Discharge Planning  Current Residence:  Condo 1 story alone   Living Arrangements:  Alone   Home has 1 stories/0 stairs to climb ramp  Support Systems:  Children  Current Services PTA:  na Agency: na  Patient able to perform ADL's:Independent  DME in home: [place], walker, cane, shower chair  DME used to aid ambulation prior to admission:   Walker prn   DME used during admission:  Walker     Potential Assistance Needed:  N/A    Pharmacy: [facility]   Potential Assistance Purchasing Medications:  No  Does patient want to participate in local refill/ meds to beds program?  No    Patient agreeable to home care: No  [place] of choice provided:  n/a      Type of Home Care Services:  None  Patient expects to be discharged to:  home    Prior SNF/Rehab Placement and Facility: no   Agreeable to SNF/Rehab: No  [place] of choice provided: n/a   Evaluation: n/a    Expected Discharge date:  21  Follow Up Appointment: Best Day/ Time: Monday AM    Transportation provider: family   Transportation arrangements needed for discharge: No    Covid Vaccine: Yes Razia Lim January     Discharge Plan:   [address]  home pending labs-Pt lives home alone, spouse  about 9 years ago. Dtr in law is NP, has family support. Xarelto $47 per month gave free month trial card. Called in Xarelto 15 mg po daily with 3 refills under Dr. Leilani Michel to [facility] spoke with [facility] Richey cost is $47 per month. Discussed with pt, ok with cost 30 day free trial card given.        Cardio consult  Ortho consult-shoulder pain   Cardizem gtt-now off  Full dose lovenox now switched to Xarelto    Electronically signed by Prem Diaz RN on 6/27/21 at 12:59 PM EDT

## 2021-06-27 NOTE — ACP (ADVANCE CARE PLANNING)
Advance Care Planning     Advance Care Planning Activator (Inpatient)  Conversation Note      Date of ACP Conversation: 6/27/2021     Conversation Conducted with: Patient with Decision Making Capacity    ACP Activator: Elie West RN        Health Care Decision Maker: self    Current Designated Health Care Decision Maker: self/son     Click here to complete [employer] including section of the Healthcare Decision Maker Relationship (ie \"Primary\")  Today we documented Decision Maker(s) consistent with Legal Next of Kin hierarchy. Care Preferences    Ventilation: \"If you were in your present state of health and suddenly became very ill and were unable to breathe on your own, what would your preference be about the use of a ventilator (breathing machine) if it were available to you? \"      Would the patient desire the use of ventilator (breathing machine)?: yes    \"If your health worsens and it becomes clear that your chance of recovery is unlikely, what would your preference be about the use of a ventilator (breathing machine) if it were available to you? \"     Would the patient desire the use of ventilator (breathing machine)?: unsure      Resuscitation  \"CPR works best to restart the heart when there is a sudden event, like a heart attack, in someone who is otherwise healthy. Unfortunately, CPR does not typically restart the heart for people who have serious health conditions or who are very sick. \"    \"In the event your heart stopped as a result of an underlying serious health condition, would you want attempts to be made to restart your heart (answer \"yes\" for attempt to resuscitate) or would you prefer a natural death (answer \"no\" for do not attempt to resuscitate)? \" unsure  Full code at this time-     [] Yes   [] No   Educated Patient / Pinky Key regarding differences between Advance Directives and portable DNR orders.     Length of ACP Conversation in minutes:  10    Conversation Outcomes:  [x] ACP discussion completed  [] Existing advance directive reviewed with patient; no changes to patient's previously recorded wishes  [] New Advance Directive completed  [] Portable Do Not Rescitate prepared for Provider review and signature  [] POLST/POST/MOLST/MOST prepared for Provider review and signature      Follow-up plan:    [] Schedule follow-up conversation to continue planning  [x] Referred individual to Provider for additional questions/concerns   [] Advised patient/agent/surrogate to review completed ACP document and update if needed with changes in condition, patient preferences or care setting    [] This note routed to one or more involved healthcare providers        Pt is unsure if she has a formal HCPOA relays she does have difficulty remembering things. Son is next of kin.

## 2021-06-27 NOTE — PROGRESS NOTES
Trop minimally elevated opt asymptomatic, cr mildly up will recheck outpt in am, carotid us 2d echo outpt

## 2021-06-27 NOTE — CONSULTS
[address] [facility] [address]                [address] [place] [address]                                  CONSULTATION    PATIENT NAME: Nguyễn Garcia                   :        1935  MED REC NO:   [id number]                             ROOM:       1002  ACCOUNT NO:   [de-identified]                           ADMIT DATE: 2021  PROVIDER:     Syeda Crowell    CONSULT DATE:  2021    REASON FOR CONSULTATION:  Atrial fibrillation with rapid ventricular  response. CHIEF COMPLAINT:  Palpitations and chest tightness. History obtained from the patient. HISTORY OF PRESENT ILLNESS:  The patient is a pleasant 41-year-old  female who presented to [facility] with chief complaints of  palpitations, chest discomfort and shortness of breath. The patient's  symptoms started a few hours prior to her presenting to the hospital.   The patient reported that she was feeling weak. She was found to be in  atrial fibrillation with rapid ventricular response and was admitted. She was started on IV diltiazem drip and Cardiology Service was  consulted for further evaluation and management of atrial fibrillation. The patient subsequently converted to sinus rhythm on IV diltiazem and  at the time of my exam, the patient is in sinus rhythm and is in no  apparent distress. PAST MEDICAL HISTORY:  1.  Malignant neoplasm of bladder. 2.  History of hypertension and dyslipidemia. PAST SURGICAL HISTORY:  Positive for colon surgery, cataract removal,  cystoscopy, ileostomy with reversal, lumbar laminectomy, knee  arthroplasty. ALLERGIES:  AUGMENTIN, TEQUIN, MORPHINE. FAMILY HISTORY:  Reviewed and is negative for sudden cardiac death or  significant coronary artery disease. The patient's mother and father  are . SOCIAL HISTORY:  She is a nonsmoker currently. She smoked for 2 to 3  years when she was young, that was over 40 years ago.   Next, she does  drink three standard drinks of alcohol per week, that is 3 glasses of  wine per week. REVIEW OF SYSTEMS:  The 15-point system review was performed and  pertinent findings were as noted. CONSTITUTIONAL:  No fever, chills, rigors. EYES:  No blurred vision or double vision. ENT:  No ear discharge, nasal discharge, or sore throat. RESPIRATORY:  Positive for shortness of breath and chest tightness. GASTROINTESTINAL:  No nausea, vomiting, diarrhea or frequency. GENITOURINARY:  No dysuria, frequency, or hematuria. MUSCULOSKELETAL:  No arthralgias or myalgias. NEUROLOGIC:  Positive for lightheadedness. HEMATOLOGIC:  Negative. PSYCHIATRIC:  Negative. PHYSICAL EXAMINATION:  GENERAL:  Shows the patient comfortable at bedrest.  VITAL SIGNS:  Blood pressure 120/49, pulse rate of 52, respiratory rate  of 15. The patient is afebrile. SpO2 is 98%. BMI 22.13 kg per meter  squared. CONSTITUTIONAL:  The patient is awake, alert and is in no apparent  distress. HEENT:  Cranium is atraumatic, normocephalic. There is no jugular  venous distention. Normal carotid upstrokes. No audible bruits. Sclerae are anicteric. Oral mucosa is moist.  SKIN:  Warm and dry. HEART:  Examination of the heart reveals S1, S2. No S3 or S4.  LUNGS:  Clear with bronchovesicular breath sounds bilaterally. ABDOMEN:  Soft. Bowel sounds are present. EXTREMITIES:  Demonstrated no edema. Symmetric distal pulses are noted,  which are equal in volume bilaterally. DIAGNOSTIC DATA:  The 12-lead EKG on admission showed atrial  fibrillation with rapid ventricular response and diffuse nonspecific  ST-T changes. LABS:  Troponin is 28, it is likely due to AFib with rapid ventricular  response. BUN is 11, creatinine 0.93. Sodium 141, potassium 4.5. IMPRESSION:  1. Paroxysmal atrial fibrillation with rapid ventricular response. Currently, the patient is maintaining sinus rhythm.   2.  Borderline high sensitivity troponin elevation, likely due to atrial  fibrillation with rapid ventricular response. In view of atrial fibrillation and high CHADS score, I recommended  anticoagulation. I discussed available anticoagulants at this time. I  will start the patient on Xarelto 20 mg daily for thromboprophylaxis. I  will start the patient on metoprolol succinate 12.5 mg p.o. daily and  continue as tolerated. She may continue losartan for management of  hypertension, stop amlodipine. An echocardiogram may be done on an  outpatient basis. Okay to discharge the patient to home with outpatient cardiology  followup.         Lisbeth Sampson    D: 06/27/2021 12:13:23       T: 06/27/2021 12:18:44     [email or web address]  Job#: [id number]     Doc#: [id number]    CC:

## 2021-06-27 NOTE — FLOWSHEET NOTE
IV and telemetry removed, Rx for labs and medications given to patient and reviewed, understanding verbalized, discharge teaching/instructions completed with patient using teachback method. AVS reviewed. Patient voiced understanding regarding prescriptions/medications, follow up appointments, and care of self at home.  Clothing brought in from [employer]

## 2021-06-27 NOTE — FLOWSHEET NOTE
Dr Davidson Piotr in to round on patient, patient informed of rt shoulder pain, order received for \"stat\" rt shoulder x-ray and consultation with Dr Artur Vallejo (ortho), Dr Artur Vallejo on unit seeing another patient and made aware

## 2021-06-27 NOTE — CONSULTS
This 81-year-old lady is seen in consultation at request of Dr. Marina Antoine because of pain in the right shoulder. Patient states that about a week ago she started having problem with the right shoulder and was not able to lift it and she was going to see her primary care physician but found out that he is on vacation. Patient states that she she has not been able to lift anything with the arm and does have pain. There is no history of injury or fall. Examination: Patient's deltoid is functioning but she is not able to lift her arm. There is crepitation in the shoulder. Her elbow function appears satisfactory as is the function in her right hand and the fingers. X-rays: I reviewed her chest x-ray and it shows severe chronic rotator cuff arthropathy of the right shoulder. Diagnosis: Rotator cuff arthropathy right shoulder. Treatment: I discussed with her the pathology of the condition. She may have had part of the rotator cuff intact until a week ago when she completed the tear and that is why she is not able to lift the shoulder. I did discuss with her that I can give her a corticosteroid injection which may help with the pain but not with the function. For restoring her function in the shoulder she would require a reverse total shoulder arthroplasty and she is going to think about it. In the meantime she would like a corticosteroid injection. I will proceed with that. After the injection from orthopedic point she can be discharged home. Shoulder x-ray is not really necessary at this stage since I was able to see enough of the shoulder on the chest film. If she does decide about having reverse shoulder arthroplasty then she will require x-rays of the shoulder AP and scapular Y. Under sterile condition I injected 40 mg Depo-Medrol and 5 cc of 1% plain lidocaine through lateral approach without any complication. The patient immediately experienced significant improvement in pain. I did discuss with her how the injection works. I will see her as necessary as outpatient.     Thank you for this consultation

## 2021-06-27 NOTE — PROGRESS NOTES
Patients HR currently in the 40's. Converted to NSR. Cardizem gtt turned off and Dr. Horacio Abreu notified. No new orders.  Will continue to monitor

## 2021-06-28 ENCOUNTER — HOSPITAL ENCOUNTER (OUTPATIENT)
Age: 86
Setting detail: SPECIMEN
Discharge: HOME | End: 2021-06-28
Payer: MEDICARE

## 2021-06-28 DIAGNOSIS — I48.91 ATRIAL FIBRILLATION WITH RVR (HCC): ICD-10-CM

## 2021-06-28 LAB
ANION GAP SERPL CALCULATED.3IONS-SCNC: 8 MMOL/L (ref 9–17)
BUN BLDV-MCNC: 17 MG/DL (ref 8–23)
BUN/CREAT BLD: ABNORMAL (ref 9–20)
CALCIUM SERPL-MCNC: 8.7 MG/DL (ref 8.6–10.4)
CHLORIDE BLD-SCNC: 102 MMOL/L (ref 98–107)
CO2: 23 MMOL/L (ref 20–31)
CREAT SERPL-MCNC: 0.87 MG/DL (ref 0.5–0.9)
EKG ATRIAL RATE: 144 BPM
EKG Q-T INTERVAL: 254 MS
EKG QRS DURATION: 66 MS
EKG QTC CALCULATION (BAZETT): 373 MS
EKG R AXIS: 60 DEGREES
EKG T AXIS: -119 DEGREES
EKG VENTRICULAR RATE: 130 BPM
GFR AFRICAN AMERICAN: >60 ML/MIN
GFR NON-AFRICAN AMERICAN: >60 ML/MIN
GFR SERPL CREATININE-BSD FRML MDRD: ABNORMAL ML/MIN/{1.73_M2}
GFR SERPL CREATININE-BSD FRML MDRD: ABNORMAL ML/MIN/{1.73_M2}
GLUCOSE BLD-MCNC: 142 MG/DL (ref 70–99)
POTASSIUM SERPL-SCNC: 4.2 MMOL/L (ref 3.7–5.3)
SODIUM BLD-SCNC: 133 MMOL/L (ref 135–144)

## 2021-06-28 PROCEDURE — 80048 BASIC METABOLIC PNL TOTAL CA: CPT

## 2021-06-28 PROCEDURE — 36415 COLL VENOUS BLD VENIPUNCTURE: CPT

## 2021-08-09 ENCOUNTER — APPOINTMENT (OUTPATIENT)
Dept: GENERAL RADIOLOGY | Age: 86
End: 2021-08-09
Payer: MEDICARE

## 2021-08-09 ENCOUNTER — HOSPITAL ENCOUNTER (EMERGENCY)
Age: 86
Discharge: HOME | End: 2021-08-09
Attending: EMERGENCY MEDICINE
Payer: MEDICARE

## 2021-08-09 ENCOUNTER — APPOINTMENT (OUTPATIENT)
Dept: CT IMAGING | Age: 86
End: 2021-08-09
Payer: MEDICARE

## 2021-08-09 VITALS
SYSTOLIC BLOOD PRESSURE: 133 MMHG | RESPIRATION RATE: 21 BRPM | BODY MASS INDEX: 19.67 KG/M2 | OXYGEN SATURATION: 98 % | WEIGHT: 111 LBS | TEMPERATURE: 98.1 F | DIASTOLIC BLOOD PRESSURE: 62 MMHG | HEART RATE: 45 BPM | HEIGHT: 63 IN

## 2021-08-09 DIAGNOSIS — R07.89 ATYPICAL CHEST PAIN: Primary | ICD-10-CM

## 2021-08-09 DIAGNOSIS — R00.1 BRADYCARDIA: ICD-10-CM

## 2021-08-09 DIAGNOSIS — R79.89 ELEVATED BRAIN NATRIURETIC PEPTIDE (BNP) LEVEL: ICD-10-CM

## 2021-08-09 LAB
ABSOLUTE EOS #: 0.26 K/UL (ref 0–0.44)
ABSOLUTE IMMATURE GRANULOCYTE: 0.03 K/UL (ref 0–0.3)
ABSOLUTE LYMPH #: 2.17 K/UL (ref 1.1–3.7)
ABSOLUTE MONO #: 0.83 K/UL (ref 0.1–1.2)
ALBUMIN SERPL-MCNC: 3.6 G/DL (ref 3.5–5.2)
ALBUMIN/GLOBULIN RATIO: ABNORMAL (ref 1–2.5)
ALP BLD-CCNC: 68 U/L (ref 35–104)
ALT SERPL-CCNC: 10 U/L (ref 5–33)
ANION GAP SERPL CALCULATED.3IONS-SCNC: 12 MMOL/L (ref 9–17)
AST SERPL-CCNC: 21 U/L
BASOPHILS # BLD: 1 % (ref 0–2)
BASOPHILS ABSOLUTE: 0.09 K/UL (ref 0–0.2)
BILIRUB SERPL-MCNC: 0.36 MG/DL (ref 0.3–1.2)
BNP INTERPRETATION: ABNORMAL
BUN BLDV-MCNC: 19 MG/DL (ref 8–23)
BUN/CREAT BLD: 22 (ref 9–20)
CALCIUM SERPL-MCNC: 8.9 MG/DL (ref 8.6–10.4)
CHLORIDE BLD-SCNC: 101 MMOL/L (ref 98–107)
CO2: 24 MMOL/L (ref 20–31)
CREAT SERPL-MCNC: 0.87 MG/DL (ref 0.5–0.9)
DIFFERENTIAL TYPE: NORMAL
EKG ATRIAL RATE: 101 BPM
EKG ATRIAL RATE: 47 BPM
EKG P AXIS: 52 DEGREES
EKG P AXIS: 71 DEGREES
EKG P-R INTERVAL: 142 MS
EKG P-R INTERVAL: 146 MS
EKG Q-T INTERVAL: 412 MS
EKG Q-T INTERVAL: 460 MS
EKG QRS DURATION: 66 MS
EKG QRS DURATION: 74 MS
EKG QTC CALCULATION (BAZETT): 375 MS
EKG QTC CALCULATION (BAZETT): 407 MS
EKG R AXIS: 61 DEGREES
EKG R AXIS: 64 DEGREES
EKG T AXIS: -94 DEGREES
EKG T AXIS: 126 DEGREES
EKG VENTRICULAR RATE: 47 BPM
EKG VENTRICULAR RATE: 50 BPM
EOSINOPHILS RELATIVE PERCENT: 3 % (ref 1–4)
GFR AFRICAN AMERICAN: >60 ML/MIN
GFR NON-AFRICAN AMERICAN: >60 ML/MIN
GFR SERPL CREATININE-BSD FRML MDRD: ABNORMAL ML/MIN/{1.73_M2}
GFR SERPL CREATININE-BSD FRML MDRD: ABNORMAL ML/MIN/{1.73_M2}
GLUCOSE BLD-MCNC: 94 MG/DL (ref 70–99)
HCT VFR BLD CALC: 39.3 % (ref 36.3–47.1)
HEMOGLOBIN: 12.3 G/DL (ref 11.9–15.1)
IMMATURE GRANULOCYTES: 0 %
LYMPHOCYTES # BLD: 24 % (ref 24–43)
MCH RBC QN AUTO: 29.3 PG (ref 25.2–33.5)
MCHC RBC AUTO-ENTMCNC: 31.3 G/DL (ref 28.4–34.8)
MCV RBC AUTO: 93.6 FL (ref 82.6–102.9)
MONOCYTES # BLD: 9 % (ref 3–12)
NRBC AUTOMATED: 0 PER 100 WBC
PDW BLD-RTO: 13.3 % (ref 11.8–14.4)
PLATELET # BLD: 199 K/UL (ref 138–453)
PLATELET ESTIMATE: NORMAL
PMV BLD AUTO: 11 FL (ref 8.1–13.5)
POTASSIUM SERPL-SCNC: 4.4 MMOL/L (ref 3.7–5.3)
PRO-BNP: 1839 PG/ML
RBC # BLD: 4.2 M/UL (ref 3.95–5.11)
RBC # BLD: NORMAL 10*6/UL
SEG NEUTROPHILS: 63 % (ref 36–65)
SEGMENTED NEUTROPHILS ABSOLUTE COUNT: 5.63 K/UL (ref 1.5–8.1)
SODIUM BLD-SCNC: 137 MMOL/L (ref 135–144)
TOTAL PROTEIN: 6 G/DL (ref 6.4–8.3)
TROPONIN INTERP: ABNORMAL
TROPONIN INTERP: ABNORMAL
TROPONIN T: ABNORMAL NG/ML
TROPONIN T: ABNORMAL NG/ML
TROPONIN, HIGH SENSITIVITY: 23 NG/L (ref 0–14)
TROPONIN, HIGH SENSITIVITY: 24 NG/L (ref 0–14)
WBC # BLD: 9 K/UL (ref 3.5–11.3)
WBC # BLD: NORMAL 10*3/UL

## 2021-08-09 PROCEDURE — 85025 COMPLETE CBC W/AUTO DIFF WBC: CPT

## 2021-08-09 PROCEDURE — 6360000004 HC RX CONTRAST MEDICATION: Performed by: EMERGENCY MEDICINE

## 2021-08-09 PROCEDURE — 84484 ASSAY OF TROPONIN QUANT: CPT

## 2021-08-09 PROCEDURE — 99285 EMERGENCY DEPT VISIT HI MDM: CPT

## 2021-08-09 PROCEDURE — 83880 ASSAY OF NATRIURETIC PEPTIDE: CPT

## 2021-08-09 PROCEDURE — 71045 X-RAY EXAM CHEST 1 VIEW: CPT

## 2021-08-09 PROCEDURE — 2580000003 HC RX 258: Performed by: EMERGENCY MEDICINE

## 2021-08-09 PROCEDURE — 6370000000 HC RX 637 (ALT 250 FOR IP): Performed by: EMERGENCY MEDICINE

## 2021-08-09 PROCEDURE — 71260 CT THORAX DX C+: CPT

## 2021-08-09 PROCEDURE — 71100 X-RAY EXAM RIBS UNI 2 VIEWS: CPT

## 2021-08-09 PROCEDURE — 80053 COMPREHEN METABOLIC PANEL: CPT

## 2021-08-09 PROCEDURE — 93005 ELECTROCARDIOGRAM TRACING: CPT | Performed by: EMERGENCY MEDICINE

## 2021-08-09 RX ORDER — 0.9 % SODIUM CHLORIDE 0.9 %
80 INTRAVENOUS SOLUTION INTRAVENOUS ONCE
Status: COMPLETED | OUTPATIENT
Start: 2021-08-09 | End: 2021-08-09

## 2021-08-09 RX ORDER — 0.9 % SODIUM CHLORIDE 0.9 %
500 INTRAVENOUS SOLUTION INTRAVENOUS ONCE
Status: COMPLETED | OUTPATIENT
Start: 2021-08-09 | End: 2021-08-09

## 2021-08-09 RX ORDER — ACETAMINOPHEN 500 MG
1000 TABLET ORAL ONCE
Status: COMPLETED | OUTPATIENT
Start: 2021-08-09 | End: 2021-08-09

## 2021-08-09 RX ORDER — SODIUM CHLORIDE 0.9 % (FLUSH) 0.9 %
10 SYRINGE (ML) INJECTION ONCE
Status: COMPLETED | OUTPATIENT
Start: 2021-08-09 | End: 2021-08-09

## 2021-08-09 RX ADMIN — SODIUM CHLORIDE, PRESERVATIVE FREE 10 ML: 5 INJECTION INTRAVENOUS at 05:11

## 2021-08-09 RX ADMIN — SODIUM CHLORIDE 80 ML: 9 INJECTION, SOLUTION INTRAVENOUS at 05:11

## 2021-08-09 RX ADMIN — SODIUM CHLORIDE 500 ML: 9 INJECTION, SOLUTION INTRAVENOUS at 03:24

## 2021-08-09 RX ADMIN — IOPAMIDOL 75 ML: 755 INJECTION, SOLUTION INTRAVENOUS at 05:10

## 2021-08-09 RX ADMIN — ACETAMINOPHEN 1000 MG: 500 TABLET ORAL at 04:43

## 2021-08-09 ASSESSMENT — PAIN SCALES - GENERAL
PAINLEVEL_OUTOF10: 6
PAINLEVEL_OUTOF10: 6
PAINLEVEL_OUTOF10: 3

## 2021-08-09 ASSESSMENT — PAIN DESCRIPTION - LOCATION: LOCATION: CHEST

## 2021-08-09 ASSESSMENT — PAIN DESCRIPTION - ORIENTATION: ORIENTATION: LEFT

## 2021-08-09 ASSESSMENT — PAIN DESCRIPTION - PAIN TYPE: TYPE: ACUTE PAIN

## 2021-08-09 NOTE — ED NOTES
Bed: 18  Expected date:   Expected time:   Means of arrival:   Comments:  Juany Menard  08/09/21 [id number]

## 2021-08-09 NOTE — ED PROVIDER NOTES
EMERGENCY DEPARTMENT ENCOUNTER    Pt Name: Eloina Martinez  MRN: [id number]  [place] 1935  Date of evaluation: 8/9/21  CHIEF COMPLAINT       Chief Complaint   Patient presents with    Chest Pain     left side under breast x30 mins ago, woke pt up from sleep     HISTORY OF PRESENT ILLNESS   Patient is an 81-year-old female with PMH of atrial fibrillation on Eliquis, hypertension, who was brought in by EMS for chest pain. Pain woke her up from sleep early this morning. Pain located over left rib cage. Pain worse with twisting upper torso or pressing on area. Pain does not radiate, nonpleuritic. Pain not associated vomiting or diaphoresis. No trauma. No recent falls. No other issues at this time. No fevers, cough, shortness of breath, abdominal pain, nausea, vomiting, changes in urine or stool. REVIEW OF SYSTEMS     Review of Systems   All other systems reviewed and are negative.     PASTMEDICAL HISTORY     Past Medical History:   Diagnosis Date    Allergic rhinitis     Aortic stenosis 2017    on echo    Arthritis     rt foot    History of echocardiogram 2017    mild MR, aortic stenosis mild, mild TR, mild pulmonary hypertension    History of stress test 2017    \"normal\" per [address] [facility]    Hyperlipidemia     on rx    Hypertension     per Dr. Howell Dys Thyroid disease     Dr. Anat Wolfe Wears prescription eyeglasses     Wellness examination     Dr. Gume Godwin last seen 10/2020     SURGICAL HISTORY       Past Surgical History:   Procedure Laterality Date    CATARACT REMOVAL      COLON SURGERY      \"intestines ruptured\"    COLONOSCOPY      within last 4 yrs    CYSTOSCOPY  11/23/2020    TUR-BT, MITOMYCIN INSTILLATION     CYSTOSCOPY N/A 11/23/2020    CYSTOSCOPY TUR BLADDER TUMOR, GYRUS, MITOMYCIN INSTILLATION performed by Valnetin Dyson MD at [facility] approx 2017    ILEOSTOMY OR JEJUNOSTOMY      reversal 2001    LUMBAR LAMINECTOMY  x 2    2 lumbar laminectomy    SHOULDER SURGERY      TOTAL KNEE ARTHROPLASTY Bilateral      CURRENT MEDICATIONS       Previous Medications    ASPIRIN 81 MG EC TABLET    Take 81 mg by mouth daily    ATORVASTATIN (LIPITOR) 10 MG TABLET    Take 10 mg by mouth daily Dr. Theresa Mcgovern DBrittaniMIN (CALCIUM 1200) [id number] MG-UNIT CHEW    Take 1 tablet by mouth daily daily    CETIRIZINE (ZYRTEC) 10 MG TABLET    Take 10 mg by mouth daily    LEVOTHYROXINE (SYNTHROID) 100 MCG TABLET    Take 100 mcg by mouth Daily Dr. Epperson    LOPERAMIDE (IMODIUM) 2 MG CAPSULE    Take 2 mg by mouth 4 times daily as needed for Diarrhea     LOSARTAN (COZAAR) 25 MG TABLET    Take 25 mg by mouth daily    METOPROLOL SUCCINATE (TOPROL XL) 25 MG EXTENDED RELEASE TABLET    Take 0.5 tablets by mouth daily    PROBIOTIC PRODUCT (PROBIOTIC-10 PO)    Take 1 capsule by mouth daily     RIVAROXABAN (XARELTO) 15 MG TABS TABLET    Take 1 tablet by mouth daily     ALLERGIES     is allergic to augmentin [amoxicillin-pot clavulanate], morphine, and tequin [gatifloxacin]. FAMILY HISTORY     She indicated that her mother is . She indicated that her father is . SOCIAL HISTORY       Social History     Tobacco Use    Smoking status: Former Smoker    Smokeless tobacco: Never Used    Tobacco comment: smoked for 2-3 years   Vaping Use    Vaping Use: Never used   Substance Use Topics    Alcohol use: Yes     Alcohol/week: 3.0 standard drinks     Types: 3 Glasses of wine per week     Comment: 3 GLASSES OF WINE PER DAY    Drug use: No     PHYSICAL EXAM     INITIAL VITALS: /62   Pulse (!) 45   Temp 98.1 °F (36.7 °C) (Oral)   Resp 21   Ht 5' 3\" (1.6 m)   Wt 111 lb (50.3 kg)   SpO2 98%   BMI 19.66 kg/m²    Physical Exam  HENT:      Head: Normocephalic.       Right Ear: External ear normal.      Left Ear: External ear normal.      Nose: Nose normal.   Eyes:      Conjunctiva/sclera: Conjunctivae normal. Cardiovascular:      Rate and Rhythm: Bradycardia present. Heart sounds: Murmur heard. Pulmonary:      Effort: Pulmonary effort is normal.      Breath sounds: No wheezing, rhonchi or rales. Abdominal:      General: Abdomen is flat. Musculoskeletal:      Comments: Reproducible tenderness left anterior rib cage. Skin:     General: Skin is dry. Neurological:      Mental Status: She is alert. Mental status is at baseline. Psychiatric:         Mood and Affect: Mood normal.         Behavior: Behavior normal.         MEDICAL DECISION MAKING:   The patient is hemodynamically stable, afebrile, nontoxic-appearing. Physical exam notable for reproducible tenderness left anterior ribs. Based on history and exam high clinical suspicion for musculoskeletal pain. Low suspicion for PE, patient is on Eliquis. Also considered ACS though less likely. ED plan for basic labs, EKG, troponin, chest x-ray, Tylenol, reassess. DIAGNOSTIC RESULTS   EKG:All EKG's are interpreted by the Emergency Department Physician who either signs or Co-signs this chart in the absence of a cardiologist.        RADIOLOGY:All plain film, CT, MRI, and formal ultrasound images (except ED bedside ultrasound) are read by the radiologist, see reports below, unless otherwisenoted in MDM or here. CT CHEST PULMONARY EMBOLISM W CONTRAST   Final Result   1. No evidence of acute pulmonary embolism. 2. Scattered mild interlobular septal thickening, as discussed above,   suggesting mild pulmonary interstitial edema. 3. Bilateral lower lung lobe peripheral mild multifocal ill-defined   consolidation suggesting pneumonia versus alveolar edema. 4. Stable 5 mm diameter noncalcified pulmonary nodule involving lateral   segment of the right middle lobe. 5. Mild cardiomegaly.       RECOMMENDATIONS:   Fleischner Society guidelines for follow-up and management of incidentally      detected pulmonary nodules:      Nodule size less than 6 mm In a low-risk patient, no routine follow-up. In a high-risk patient, optional CT at 12 months. - Low risk patients include individuals with minimal or absent history of      smoking and other known risk factors. - High risk patients include individuals with a history or smoking or known      risk factors. Radiology 2017 http: //pubs. rsna.org/doi/full/10.1148/radiol. 5202673022         XR RIBS LEFT (2 VIEWS)   Final Result   No displaced rib fracture. XR CHEST PORTABLE   Final Result   No radiographic evidence of acute cardiopulmonary disease. LABS: All lab results were reviewed by myself, and all abnormals are listed below. Labs Reviewed   COMPREHENSIVE METABOLIC PANEL W/ REFLEX TO MG FOR LOW K - Abnormal; Notable for the following components:       Result Value    Bun/Cre Ratio 22 (*)     Total Protein 6.0 (*)     All other components within normal limits   TROPONIN - Abnormal; Notable for the following components:    Troponin, High Sensitivity 24 (*)     All other components within normal limits   BRAIN NATRIURETIC PEPTIDE - Abnormal; Notable for the following components:    Pro-BNP 1,839 (*)     All other components within normal limits   CBC WITH AUTO DIFFERENTIAL   TROPONIN   TROPONIN       EMERGENCY DEPARTMENTCOURSE:   Patient main stable in the ED. Pain improved slightly with Tylenol. Labs:  Potassium 4.4  Creatinine 0.87  BNP 1,839  Troponin 24  WBC 9  Chest x-ray negative for infiltrate. Rib x-ray negative for acute osseous injury. CT chest negative for PE, does show evidence of mild pulmonary interstitial edema. Patient on Lasix. I discussed bradycardia, elevated BNP with patient and daughter-in-law. Family decided not to be admitted for further management but will follow up with team doctors after discharge. No further work-up indicated at this time. Nursing notes reviewed.   At this time this is what I find, the patient appears well and does not appear sick or toxic. I gave my usual and customary discussion of the risks and benefits of discharge versus admission. I answered the patient's questions. I gave the patient strict return precautions. Patient expressed understanding of the discharge instructions. Dictated but not reviewed. Vitals:    Vitals:    08/09/21 0315 08/09/21 0430 08/09/21 0445 08/09/21 0545   BP: (!) 144/46 (!) 142/47 (!) 167/49 133/62   Pulse: (!) 49 50 (!) 47 (!) 45   Resp: 20 20 18 21   Temp:       TempSrc:       SpO2: 94% 94% 99% 98%   Weight:       Height:           The patient was given the following medications while in the emergency department:  Orders Placed This Encounter   Medications    0.9 % sodium chloride bolus    acetaminophen (TYLENOL) tablet 1,000 mg    0.9 % sodium chloride bolus    iopamidol (ISOVUE-370) 76 % injection 75 mL    sodium chloride flush 0.9 % injection 10 mL     CONSULTS:  None    FINAL IMPRESSION      1. Atypical chest pain    2. Elevated brain natriuretic peptide (BNP) level    3.  Bradycardia          DISPOSITION/PLAN   DISPOSITION Decision To Discharge 08/09/2021 05:58:50 AM      PATIENT REFERRED TO:  Ellen Sánchez MD  [facility] [phone]    In 2 days      DISCHARGE MEDICATIONS:  New Prescriptions    No medications on file     Radha Crow MD  Attending Emergency Physician                    Rashard Jarrell MD  08/09/21 1033

## 2021-12-19 ENCOUNTER — HOSPITAL ENCOUNTER (EMERGENCY)
Age: 86
Discharge: HOME | End: 2021-12-20
Attending: EMERGENCY MEDICINE
Payer: MEDICARE

## 2021-12-19 ENCOUNTER — APPOINTMENT (OUTPATIENT)
Dept: GENERAL RADIOLOGY | Age: 86
End: 2021-12-19
Payer: MEDICARE

## 2021-12-19 VITALS
BODY MASS INDEX: 20.37 KG/M2 | DIASTOLIC BLOOD PRESSURE: 78 MMHG | WEIGHT: 115 LBS | OXYGEN SATURATION: 97 % | RESPIRATION RATE: 18 BRPM | TEMPERATURE: 97.4 F | SYSTOLIC BLOOD PRESSURE: 107 MMHG | HEART RATE: 45 BPM

## 2021-12-19 DIAGNOSIS — E86.1 HYPOTENSION DUE TO HYPOVOLEMIA: ICD-10-CM

## 2021-12-19 DIAGNOSIS — I95.89 HYPOTENSION DUE TO HYPOVOLEMIA: ICD-10-CM

## 2021-12-19 DIAGNOSIS — E86.0 DEHYDRATION: Primary | ICD-10-CM

## 2021-12-19 LAB
ABSOLUTE EOS #: 0.31 K/UL (ref 0–0.44)
ABSOLUTE IMMATURE GRANULOCYTE: 0.02 K/UL (ref 0–0.3)
ABSOLUTE LYMPH #: 2.47 K/UL (ref 1.1–3.7)
ABSOLUTE MONO #: 0.73 K/UL (ref 0.1–1.2)
ALBUMIN SERPL-MCNC: 4.1 G/DL (ref 3.5–5.2)
ALBUMIN/GLOBULIN RATIO: ABNORMAL (ref 1–2.5)
ALP BLD-CCNC: 56 U/L (ref 35–104)
ALT SERPL-CCNC: 16 U/L (ref 5–33)
ANION GAP SERPL CALCULATED.3IONS-SCNC: 11 MMOL/L (ref 9–17)
AST SERPL-CCNC: 22 U/L
BASOPHILS # BLD: 1 % (ref 0–2)
BASOPHILS ABSOLUTE: 0.08 K/UL (ref 0–0.2)
BILIRUB SERPL-MCNC: 0.2 MG/DL (ref 0.3–1.2)
BNP INTERPRETATION: ABNORMAL
BUN BLDV-MCNC: 30 MG/DL (ref 8–23)
BUN/CREAT BLD: 32 (ref 9–20)
CALCIUM SERPL-MCNC: 9 MG/DL (ref 8.6–10.4)
CHLORIDE BLD-SCNC: 102 MMOL/L (ref 98–107)
CO2: 27 MMOL/L (ref 20–31)
CREAT SERPL-MCNC: 0.93 MG/DL (ref 0.5–0.9)
DIFFERENTIAL TYPE: ABNORMAL
EOSINOPHILS RELATIVE PERCENT: 4 % (ref 1–4)
GFR AFRICAN AMERICAN: >60 ML/MIN
GFR NON-AFRICAN AMERICAN: 57 ML/MIN
GFR SERPL CREATININE-BSD FRML MDRD: ABNORMAL ML/MIN/{1.73_M2}
GFR SERPL CREATININE-BSD FRML MDRD: ABNORMAL ML/MIN/{1.73_M2}
GLUCOSE BLD-MCNC: 67 MG/DL (ref 70–99)
HCT VFR BLD CALC: 38.7 % (ref 36.3–47.1)
HEMOGLOBIN: 12.2 G/DL (ref 11.9–15.1)
IMMATURE GRANULOCYTES: 0 %
INR BLD: 1
LYMPHOCYTES # BLD: 34 % (ref 24–43)
MCH RBC QN AUTO: 28.7 PG (ref 25.2–33.5)
MCHC RBC AUTO-ENTMCNC: 31.5 G/DL (ref 28.4–34.8)
MCV RBC AUTO: 91.1 FL (ref 82.6–102.9)
MONOCYTES # BLD: 10 % (ref 3–12)
NRBC AUTOMATED: 0 PER 100 WBC
PDW BLD-RTO: 14.8 % (ref 11.8–14.4)
PLATELET # BLD: 195 K/UL (ref 138–453)
PLATELET ESTIMATE: ABNORMAL
PMV BLD AUTO: 11.9 FL (ref 8.1–13.5)
POTASSIUM SERPL-SCNC: 4.1 MMOL/L (ref 3.7–5.3)
PRO-BNP: 1404 PG/ML
PROTHROMBIN TIME: 13 SEC (ref 11.5–14.2)
RBC # BLD: 4.25 M/UL (ref 3.95–5.11)
RBC # BLD: ABNORMAL 10*6/UL
SEG NEUTROPHILS: 51 % (ref 36–65)
SEGMENTED NEUTROPHILS ABSOLUTE COUNT: 3.76 K/UL (ref 1.5–8.1)
SODIUM BLD-SCNC: 140 MMOL/L (ref 135–144)
TOTAL PROTEIN: 7 G/DL (ref 6.4–8.3)
TROPONIN INTERP: ABNORMAL
TROPONIN INTERP: ABNORMAL
TROPONIN T: ABNORMAL NG/ML
TROPONIN T: ABNORMAL NG/ML
TROPONIN, HIGH SENSITIVITY: 22 NG/L (ref 0–14)
TROPONIN, HIGH SENSITIVITY: 24 NG/L (ref 0–14)
WBC # BLD: 7.4 K/UL (ref 3.5–11.3)
WBC # BLD: ABNORMAL 10*3/UL

## 2021-12-19 PROCEDURE — 71045 X-RAY EXAM CHEST 1 VIEW: CPT

## 2021-12-19 PROCEDURE — 96361 HYDRATE IV INFUSION ADD-ON: CPT

## 2021-12-19 PROCEDURE — 93005 ELECTROCARDIOGRAM TRACING: CPT | Performed by: PHYSICIAN ASSISTANT

## 2021-12-19 PROCEDURE — 83880 ASSAY OF NATRIURETIC PEPTIDE: CPT

## 2021-12-19 PROCEDURE — 96360 HYDRATION IV INFUSION INIT: CPT

## 2021-12-19 PROCEDURE — 99283 EMERGENCY DEPT VISIT LOW MDM: CPT

## 2021-12-19 PROCEDURE — 2580000003 HC RX 258: Performed by: PHYSICIAN ASSISTANT

## 2021-12-19 PROCEDURE — 85610 PROTHROMBIN TIME: CPT

## 2021-12-19 PROCEDURE — 80053 COMPREHEN METABOLIC PANEL: CPT

## 2021-12-19 PROCEDURE — 84484 ASSAY OF TROPONIN QUANT: CPT

## 2021-12-19 PROCEDURE — 85025 COMPLETE CBC W/AUTO DIFF WBC: CPT

## 2021-12-19 RX ORDER — 0.9 % SODIUM CHLORIDE 0.9 %
250 INTRAVENOUS SOLUTION INTRAVENOUS ONCE
Status: COMPLETED | OUTPATIENT
Start: 2021-12-19 | End: 2021-12-19

## 2021-12-19 RX ORDER — 0.9 % SODIUM CHLORIDE 0.9 %
250 INTRAVENOUS SOLUTION INTRAVENOUS ONCE
Status: COMPLETED | OUTPATIENT
Start: 2021-12-19 | End: 2021-12-20

## 2021-12-19 RX ADMIN — SODIUM CHLORIDE 250 ML: 9 INJECTION, SOLUTION INTRAVENOUS at 22:10

## 2021-12-19 RX ADMIN — SODIUM CHLORIDE 250 ML: 9 INJECTION, SOLUTION INTRAVENOUS at 23:29

## 2021-12-19 ASSESSMENT — ENCOUNTER SYMPTOMS
VOMITING: 0
SHORTNESS OF BREATH: 0

## 2021-12-20 LAB
EKG ATRIAL RATE: 47 BPM
EKG P AXIS: 70 DEGREES
EKG P-R INTERVAL: 144 MS
EKG Q-T INTERVAL: 452 MS
EKG QRS DURATION: 66 MS
EKG QTC CALCULATION (BAZETT): 400 MS
EKG R AXIS: 51 DEGREES
EKG T AXIS: 116 DEGREES
EKG VENTRICULAR RATE: 47 BPM

## 2021-12-20 PROCEDURE — 93010 ELECTROCARDIOGRAM REPORT: CPT | Performed by: INTERNAL MEDICINE

## 2021-12-20 NOTE — ED PROVIDER NOTES
eMERGENCY dEPARTMENT eNCOUnter   [address] Name: Muriel Martinez  MRN: [id number]  [place] 1935  Date of evaluation: 12/19/21     Muriel Martinez is a 81 y.o. female with CC: Hypotension (Family states pt called them and told them she wasn't feeling well; family took BP at home and said it was 79/46 with a HR of 44)      Based on the medical record the care appears appropriate. I was personally available for consultation in the Emergency Department.   The care is provided during an unprecedented national emergency due to the novel coronavirus, Anders Manrique MD  Attending Emergency Physician                    Keagan Romero MD  12/19/21 602 [address] Subha Pierce MD  12/20/21 0003

## 2021-12-20 NOTE — ED PROVIDER NOTES
[address] [facility] ED  eMERGENCY dEPARTMENT eNCOUnter      Pt Name: Sona Iyer  MRN: [id number]  [place] 1935  Date of evaluation: 12/19/21      CHIEF COMPLAINT       Chief Complaint   Patient presents with    Hypotension     Family states pt called them and told them she wasn't feeling well; family took BP at home and said it was 79/46 with a HR of 44         HISTORY OF PRESENT ILLNESS    Sona Iyer is a 81 y.o. female who presents complaining of low heart rate and low BP   The history is provided by the patient. Dizziness  Description: She was not feeling well she called her daughter who came over to her house checked her blood pressure and blood pressure was 75/44 and her heart rate was 45 she has a history of A. fib and she takes Lopressor for rate control. Severity:  Mild  Onset quality:  Sudden  Duration:  2 hours  Chronicity:  New  Relieved by:  Nothing  Worsened by:  Nothing  Ineffective treatments:  None tried  Associated symptoms: no chest pain, no palpitations, no shortness of breath and no vomiting        REVIEW OF SYSTEMS       Review of Systems   Respiratory: Negative for shortness of breath. Cardiovascular: Negative for chest pain, palpitations and leg swelling. Gastrointestinal: Negative for vomiting. Neurological: Positive for dizziness. All other systems reviewed and are negative.       PAST MEDICAL HISTORY     Past Medical History:   Diagnosis Date    Allergic rhinitis     Aortic stenosis 2017    on echo    Arthritis     rt foot    History of echocardiogram 2017    mild MR, aortic stenosis mild, mild TR, mild pulmonary hypertension    History of stress test 2017    \"normal\" per [facility]    Hyperlipidemia     on rx    Hypertension     per Dr. Byrne [facility] Thyroid disease     Dr. Kylah Pena Wears prescription eyeglasses     Wellness examination     Dr. Epperson last seen 10/2020       SURGICAL HISTORY       Past Surgical History:   Procedure Laterality Date    CATARACT REMOVAL      COLON SURGERY      \"intestines ruptured\"    COLONOSCOPY      within last 4 yrs    CYSTOSCOPY  2020    TUR-BT, MITOMYCIN INSTILLATION     CYSTOSCOPY N/A 2020    CYSTOSCOPY TUR BLADDER TUMOR, GYRUS, MITOMYCIN INSTILLATION performed by Shawn Menjivar MD at [facility] approx 2017    ILEOSTOMY OR JEJUNOSTOMY      reversal     LUMBAR LAMINECTOMY  x 2    2 lumbar laminectomy    SHOULDER SURGERY      TOTAL KNEE ARTHROPLASTY Bilateral        CURRENT MEDICATIONS       Previous Medications    ASPIRIN 81 MG EC TABLET    Take 81 mg by mouth daily    ATORVASTATIN (LIPITOR) 10 MG TABLET    Take 10 mg by mouth daily Dr. Elsa Villareal D-MIN (CALCIUM 1200) [id number] MG-UNIT CHEW    Take 1 tablet by mouth daily daily    CETIRIZINE (ZYRTEC) 10 MG TABLET    Take 10 mg by mouth daily    LEVOTHYROXINE (SYNTHROID) 100 MCG TABLET    Take 100 mcg by mouth Daily  [facility]    LOPERAMIDE (IMODIUM) 2 MG CAPSULE    Take 2 mg by mouth 4 times daily as needed for Diarrhea     LOSARTAN (COZAAR) 25 MG TABLET    Take 25 mg by mouth daily    METOPROLOL SUCCINATE (TOPROL XL) 25 MG EXTENDED RELEASE TABLET    Take 0.5 tablets by mouth daily    PROBIOTIC PRODUCT (PROBIOTIC-10 PO)    Take 1 capsule by mouth daily     RIVAROXABAN (XARELTO) 15 MG TABS TABLET    Take 1 tablet by mouth daily       ALLERGIES     is allergic to augmentin [amoxicillin-pot clavulanate], morphine, and tequin [gatifloxacin]. FAMILY HISTORY     She indicated that her mother is . She indicated that her father is . SOCIAL HISTORY      reports that she has quit smoking. She has never used smokeless tobacco. She reports current alcohol use of about 3.0 standard drinks of alcohol per week. She reports that she does not use drugs.     PHYSICAL EXAM     INITIAL VITALS: /78   Pulse (!) 45   Temp 97.4 °F (36.3 °C) (Oral)   Resp 18   Wt 115 lb (52.2 kg)   SpO2 97%   BMI 20.37 kg/m²      Physical Exam  Vitals and nursing note reviewed. Constitutional:       Appearance: She is well-developed. HENT:      Head: Normocephalic and atraumatic. Right Ear: Tympanic membrane normal.      Left Ear: Tympanic membrane normal.      Nose: Nose normal.   Eyes:      Pupils: Pupils are equal, round, and reactive to light. Cardiovascular:      Rate and Rhythm: Regular rhythm. Bradycardia present. Heart sounds: Normal heart sounds. No murmur heard. Pulmonary:      Effort: Pulmonary effort is normal.      Breath sounds: Normal breath sounds. Abdominal:      General: Bowel sounds are normal.      Palpations: Abdomen is soft. Tenderness: There is no abdominal tenderness. Musculoskeletal:         General: Normal range of motion. Cervical back: Normal range of motion. Skin:     General: Skin is warm and dry. Capillary Refill: Capillary refill takes less than 2 seconds. Neurological:      General: No focal deficit present. Mental Status: She is alert and oriented to person, place, and time. Cranial Nerves: No cranial nerve deficit. Psychiatric:         Mood and Affect: Mood normal.         MEDICAL DECISION MAKING:     Patient with a not feeling well family came over checked her blood pressure and it was low about 75 or 40s. Patient presents today with hypotension and low heart rate. Her heart rate is normally a little bit low as she is on Lopressor for rate control due to having atrial fib with rapid ventricular response. She is also taking anticoagulants and has been taking them as usual.  She denies any headache or blurred vision she states she feels fine the family brought her in because her blood pressure was somewhat low. Her physical exam is unremarkable she is alert she is oriented she is well appearing.   While in the emergency department she was given IV fluids we checked cardiac enzymes BNP and chest x-ray. Opponent mildly elevated BUN and creatinine elevated. We will give a second 250 cc fluid bolus repeat the troponin and if it is similar will discharge patient home encourage her to drink fluids eat a healthy diet follow-up with the primary care provider in 1 to 2 days and return if she has any worsening of symptoms any other concerns. DIAGNOSTIC RESULTS     EKG: All EKG's are interpreted by the Emergency Department Physician who either signs or Co-signs this chart in the absence of acardiologist.        RADIOLOGY:Allplain film, CT, MRI, and formal ultrasound images (except ED bedside ultrasound) are read by the radiologist and the images and interpretations are directly viewed by the emergency physician. LABS:All lab results were reviewed by myself, and all abnormals are listed below.   Labs Reviewed   CBC WITH AUTO DIFFERENTIAL - Abnormal; Notable for the following components:       Result Value    RDW 14.8 (*)     All other components within normal limits   TROPONIN - Abnormal; Notable for the following components:    Troponin, High Sensitivity 24 (*)     All other components within normal limits   COMPREHENSIVE METABOLIC PANEL W/ REFLEX TO MG FOR LOW K - Abnormal; Notable for the following components:    Glucose 67 (*)     BUN 30 (*)     CREATININE 0.93 (*)     Bun/Cre Ratio 32 (*)     Total Bilirubin 0.20 (*)     GFR Non- 57 (*)     All other components within normal limits   BRAIN NATRIURETIC PEPTIDE - Abnormal; Notable for the following components:    Pro-BNP 1,404 (*)     All other components within normal limits   PROTIME-INR   TROPONIN         EMERGENCY DEPARTMENT COURSE:   Vitals:    Vitals:    12/19/21 2125 12/19/21 2127 12/19/21 2200   BP: 93/73  107/78   Pulse: (!) 45  (!) 45   Resp: 18     Temp: 97.4 °F (36.3 °C)     TempSrc: Oral     SpO2: 99%  97%   Weight:  115 lb (52.2 kg)        The patient was given the following medications while in the emergency department:  Orders Placed This Encounter   Medications    0.9 % sodium chloride bolus    0.9 % sodium chloride bolus       -------------------------      CRITICAL CARE:    CONSULTS:  None    PROCEDURES:  Procedures    FINAL IMPRESSION      1. Dehydration    2.  Hypotension due to hypovolemia          DISPOSITION/PLAN   DISPOSITION Discharge - Pending Orders Complete 12/19/2021 11:20:11 PM      PATIENT REFERREDTO:  Jeff Mayorga MD  [address]  [phone]    Schedule an appointment as soon as possible for a visit in 2 days      [facility] ED  [address] [facility]  [phone]    If symptoms worsen      DISCHARGEMEDICATIONS:  New Prescriptions    No medications on file       (Please note that portions of this note were completed with a voice recognition program.  Efforts were made to edit thedictations but occasionally words are mis-transcribed.)    JUAN Mascorro PA-C  12/19/21 8179

## 2021-12-20 NOTE — ED NOTES
Pt to ED via private auto brought in by family. Pt's family reports pt called them and states she was not feeling well. Pt's family took pt's BP reporting it was 79/46, and HR was 45. Pt is A&Ox4 upon arrival, pt is a poor historian. Pt reports \"feeling fine\". Pt does not recall calling her family tonight. Pt denies dizziness, pt denies pain, pt denies confusion. Pt's family reports she \"looks better\" at this time.      Pedro Luis Montelongo, JOSÉ  12/19/21 David Ward, JOSÉ  12/19/21 9450

## 2022-03-09 ENCOUNTER — ANESTHESIA EVENT (OUTPATIENT)
Dept: OPERATING ROOM | Age: 87
DRG: 039 | End: 2022-03-09
Payer: MEDICARE

## 2022-03-10 ENCOUNTER — HOSPITAL ENCOUNTER (INPATIENT)
Age: 87
LOS: 1 days | Discharge: HOME | DRG: 039 | End: 2022-03-11
Attending: SURGERY | Admitting: SURGERY
Payer: MEDICARE

## 2022-03-10 ENCOUNTER — ANESTHESIA (OUTPATIENT)
Dept: OPERATING ROOM | Age: 87
DRG: 039 | End: 2022-03-10
Payer: MEDICARE

## 2022-03-10 VITALS — SYSTOLIC BLOOD PRESSURE: 122 MMHG | OXYGEN SATURATION: 96 % | DIASTOLIC BLOOD PRESSURE: 50 MMHG | TEMPERATURE: 76.7 F

## 2022-03-10 DIAGNOSIS — Z98.890 S/P CAROTID ENDARTERECTOMY: Primary | ICD-10-CM

## 2022-03-10 LAB
GFR NON-AFRICAN AMERICAN: 54 ML/MIN
GFR SERPL CREATININE-BSD FRML MDRD: >60 ML/MIN
GFR SERPL CREATININE-BSD FRML MDRD: ABNORMAL ML/MIN/{1.73_M2}
GLUCOSE BLD-MCNC: 87 MG/DL (ref 74–100)
POC BUN: NORMAL MG/DL (ref 8–26)
POC CHLORIDE: 103 MMOL/L (ref 98–107)
POC CREATININE: 0.98 MG/DL (ref 0.51–1.19)
POC HEMATOCRIT: 37 % (ref 36–46)
POC HEMOGLOBIN: 12.6 G/DL (ref 12–16)
POC POTASSIUM: 4.4 MMOL/L (ref 3.5–4.5)
POC SODIUM: 145 MMOL/L (ref 138–146)

## 2022-03-10 PROCEDURE — 6370000000 HC RX 637 (ALT 250 FOR IP): Performed by: STUDENT IN AN ORGANIZED HEALTH CARE EDUCATION/TRAINING PROGRAM

## 2022-03-10 PROCEDURE — 88304 TISSUE EXAM BY PATHOLOGIST: CPT

## 2022-03-10 PROCEDURE — 3600000003 HC SURGERY LEVEL 3 BASE: Performed by: SURGERY

## 2022-03-10 PROCEDURE — 6360000002 HC RX W HCPCS: Performed by: NURSE ANESTHETIST, CERTIFIED REGISTERED

## 2022-03-10 PROCEDURE — 3700000001 HC ADD 15 MINUTES (ANESTHESIA): Performed by: SURGERY

## 2022-03-10 PROCEDURE — 6360000002 HC RX W HCPCS: Performed by: SURGERY

## 2022-03-10 PROCEDURE — 1200000000 HC SEMI PRIVATE

## 2022-03-10 PROCEDURE — 2580000003 HC RX 258: Performed by: SURGERY

## 2022-03-10 PROCEDURE — 2580000003 HC RX 258: Performed by: NURSE ANESTHETIST, CERTIFIED REGISTERED

## 2022-03-10 PROCEDURE — 3600000013 HC SURGERY LEVEL 3 ADDTL 15MIN: Performed by: SURGERY

## 2022-03-10 PROCEDURE — 2709999900 HC NON-CHARGEABLE SUPPLY: Performed by: SURGERY

## 2022-03-10 PROCEDURE — 2720000010 HC SURG SUPPLY STERILE: Performed by: SURGERY

## 2022-03-10 PROCEDURE — 88311 DECALCIFY TISSUE: CPT

## 2022-03-10 PROCEDURE — 6360000002 HC RX W HCPCS: Performed by: STUDENT IN AN ORGANIZED HEALTH CARE EDUCATION/TRAINING PROGRAM

## 2022-03-10 PROCEDURE — 84295 ASSAY OF SERUM SODIUM: CPT

## 2022-03-10 PROCEDURE — 2580000003 HC RX 258: Performed by: STUDENT IN AN ORGANIZED HEALTH CARE EDUCATION/TRAINING PROGRAM

## 2022-03-10 PROCEDURE — 85014 HEMATOCRIT: CPT

## 2022-03-10 PROCEDURE — 84132 ASSAY OF SERUM POTASSIUM: CPT

## 2022-03-10 PROCEDURE — 03CK0ZZ EXTIRPATION OF MATTER FROM RIGHT INTERNAL CAROTID ARTERY, OPEN APPROACH: ICD-10-PCS | Performed by: SURGERY

## 2022-03-10 PROCEDURE — 82947 ASSAY GLUCOSE BLOOD QUANT: CPT

## 2022-03-10 PROCEDURE — 82435 ASSAY OF BLOOD CHLORIDE: CPT

## 2022-03-10 PROCEDURE — 2000000000 HC ICU R&B

## 2022-03-10 PROCEDURE — 3700000000 HC ANESTHESIA ATTENDED CARE: Performed by: SURGERY

## 2022-03-10 PROCEDURE — 2500000003 HC RX 250 WO HCPCS: Performed by: NURSE ANESTHETIST, CERTIFIED REGISTERED

## 2022-03-10 PROCEDURE — 03UK0KZ SUPPLEMENT RIGHT INTERNAL CAROTID ARTERY WITH NONAUTOLOGOUS TISSUE SUBSTITUTE, OPEN APPROACH: ICD-10-PCS | Performed by: SURGERY

## 2022-03-10 PROCEDURE — 82565 ASSAY OF CREATININE: CPT

## 2022-03-10 DEVICE — GRAFT BIO TISS W0.8XL8CM DECELLULARIZED BOV PERICARD PTCH: Type: IMPLANTABLE DEVICE | Status: FUNCTIONAL

## 2022-03-10 RX ORDER — SODIUM CHLORIDE 9 MG/ML
25 INJECTION, SOLUTION INTRAVENOUS PRN
Status: DISCONTINUED | OUTPATIENT
Start: 2022-03-10 | End: 2022-03-11 | Stop reason: HOSPADM

## 2022-03-10 RX ORDER — SODIUM CHLORIDE, SODIUM LACTATE, POTASSIUM CHLORIDE, CALCIUM CHLORIDE 600; 310; 30; 20 MG/100ML; MG/100ML; MG/100ML; MG/100ML
INJECTION, SOLUTION INTRAVENOUS CONTINUOUS
Status: DISCONTINUED | OUTPATIENT
Start: 2022-03-10 | End: 2022-03-11

## 2022-03-10 RX ORDER — PHENYLEPHRINE HCL IN 0.9% NACL 1 MG/10 ML
SYRINGE (ML) INTRAVENOUS PRN
Status: DISCONTINUED | OUTPATIENT
Start: 2022-03-10 | End: 2022-03-10 | Stop reason: SDUPTHER

## 2022-03-10 RX ORDER — SODIUM CHLORIDE 0.9 % (FLUSH) 0.9 %
5-40 SYRINGE (ML) INJECTION EVERY 12 HOURS SCHEDULED
Status: DISCONTINUED | OUTPATIENT
Start: 2022-03-10 | End: 2022-03-10 | Stop reason: HOSPADM

## 2022-03-10 RX ORDER — SODIUM CHLORIDE 9 MG/ML
INJECTION, SOLUTION INTRAVENOUS CONTINUOUS
Status: DISCONTINUED | OUTPATIENT
Start: 2022-03-10 | End: 2022-03-11

## 2022-03-10 RX ORDER — SODIUM CHLORIDE 0.9 % (FLUSH) 0.9 %
5-40 SYRINGE (ML) INJECTION PRN
Status: DISCONTINUED | OUTPATIENT
Start: 2022-03-10 | End: 2022-03-11 | Stop reason: HOSPADM

## 2022-03-10 RX ORDER — NITROGLYCERIN 20 MG/100ML
INJECTION INTRAVENOUS PRN
Status: DISCONTINUED | OUTPATIENT
Start: 2022-03-10 | End: 2022-03-10 | Stop reason: SDUPTHER

## 2022-03-10 RX ORDER — NEOSTIGMINE METHYLSULFATE 5 MG/5 ML
SYRINGE (ML) INTRAVENOUS PRN
Status: DISCONTINUED | OUTPATIENT
Start: 2022-03-10 | End: 2022-03-10 | Stop reason: SDUPTHER

## 2022-03-10 RX ORDER — LABETALOL HYDROCHLORIDE 5 MG/ML
INJECTION, SOLUTION INTRAVENOUS PRN
Status: DISCONTINUED | OUTPATIENT
Start: 2022-03-10 | End: 2022-03-10 | Stop reason: SDUPTHER

## 2022-03-10 RX ORDER — ASPIRIN 81 MG/1
81 TABLET ORAL DAILY
Status: DISCONTINUED | OUTPATIENT
Start: 2022-03-11 | End: 2022-03-11 | Stop reason: HOSPADM

## 2022-03-10 RX ORDER — SODIUM CHLORIDE 0.9 % (FLUSH) 0.9 %
5-40 SYRINGE (ML) INJECTION EVERY 12 HOURS SCHEDULED
Status: DISCONTINUED | OUTPATIENT
Start: 2022-03-10 | End: 2022-03-11 | Stop reason: HOSPADM

## 2022-03-10 RX ORDER — PROPOFOL 10 MG/ML
INJECTION, EMULSION INTRAVENOUS PRN
Status: DISCONTINUED | OUTPATIENT
Start: 2022-03-10 | End: 2022-03-10 | Stop reason: SDUPTHER

## 2022-03-10 RX ORDER — GLYCOPYRROLATE 1 MG/5 ML
SYRINGE (ML) INTRAVENOUS PRN
Status: DISCONTINUED | OUTPATIENT
Start: 2022-03-10 | End: 2022-03-10 | Stop reason: SDUPTHER

## 2022-03-10 RX ORDER — ONDANSETRON 2 MG/ML
4 INJECTION INTRAMUSCULAR; INTRAVENOUS EVERY 6 HOURS PRN
Status: DISCONTINUED | OUTPATIENT
Start: 2022-03-10 | End: 2022-03-11 | Stop reason: HOSPADM

## 2022-03-10 RX ORDER — HEPARIN SODIUM 1000 [USP'U]/ML
INJECTION, SOLUTION INTRAVENOUS; SUBCUTANEOUS
Status: COMPLETED
Start: 2022-03-10 | End: 2022-03-10

## 2022-03-10 RX ORDER — ATORVASTATIN CALCIUM 20 MG/1
20 TABLET, FILM COATED ORAL NIGHTLY
Status: DISCONTINUED | OUTPATIENT
Start: 2022-03-11 | End: 2022-03-11 | Stop reason: HOSPADM

## 2022-03-10 RX ORDER — ONDANSETRON 4 MG/1
4 TABLET, ORALLY DISINTEGRATING ORAL EVERY 8 HOURS PRN
Status: DISCONTINUED | OUTPATIENT
Start: 2022-03-10 | End: 2022-03-11 | Stop reason: HOSPADM

## 2022-03-10 RX ORDER — SODIUM CHLORIDE 9 MG/ML
25 INJECTION, SOLUTION INTRAVENOUS PRN
Status: DISCONTINUED | OUTPATIENT
Start: 2022-03-10 | End: 2022-03-10 | Stop reason: HOSPADM

## 2022-03-10 RX ORDER — PROTAMINE SULFATE 10 MG/ML
INJECTION, SOLUTION INTRAVENOUS PRN
Status: DISCONTINUED | OUTPATIENT
Start: 2022-03-10 | End: 2022-03-10 | Stop reason: SDUPTHER

## 2022-03-10 RX ORDER — PROTAMINE SULFATE 10 MG/ML
INJECTION, SOLUTION INTRAVENOUS
Status: COMPLETED
Start: 2022-03-10 | End: 2022-03-10

## 2022-03-10 RX ORDER — HEPARIN SODIUM 1000 [USP'U]/ML
INJECTION, SOLUTION INTRAVENOUS; SUBCUTANEOUS PRN
Status: DISCONTINUED | OUTPATIENT
Start: 2022-03-10 | End: 2022-03-10 | Stop reason: SDUPTHER

## 2022-03-10 RX ORDER — FENTANYL CITRATE 50 UG/ML
INJECTION, SOLUTION INTRAMUSCULAR; INTRAVENOUS PRN
Status: DISCONTINUED | OUTPATIENT
Start: 2022-03-10 | End: 2022-03-10 | Stop reason: SDUPTHER

## 2022-03-10 RX ORDER — EPHEDRINE SULFATE/0.9% NACL/PF 50 MG/5 ML
SYRINGE (ML) INTRAVENOUS PRN
Status: DISCONTINUED | OUTPATIENT
Start: 2022-03-10 | End: 2022-03-10 | Stop reason: SDUPTHER

## 2022-03-10 RX ORDER — SODIUM CHLORIDE, SODIUM LACTATE, POTASSIUM CHLORIDE, CALCIUM CHLORIDE 600; 310; 30; 20 MG/100ML; MG/100ML; MG/100ML; MG/100ML
INJECTION, SOLUTION INTRAVENOUS CONTINUOUS PRN
Status: DISCONTINUED | OUTPATIENT
Start: 2022-03-10 | End: 2022-03-10 | Stop reason: SDUPTHER

## 2022-03-10 RX ORDER — ROCURONIUM BROMIDE 10 MG/ML
INJECTION, SOLUTION INTRAVENOUS PRN
Status: DISCONTINUED | OUTPATIENT
Start: 2022-03-10 | End: 2022-03-10 | Stop reason: SDUPTHER

## 2022-03-10 RX ORDER — ACETAMINOPHEN 500 MG
1000 TABLET ORAL EVERY 8 HOURS
Status: DISCONTINUED | OUTPATIENT
Start: 2022-03-10 | End: 2022-03-11 | Stop reason: HOSPADM

## 2022-03-10 RX ORDER — SODIUM CHLORIDE 0.9 % (FLUSH) 0.9 %
5-40 SYRINGE (ML) INJECTION PRN
Status: DISCONTINUED | OUTPATIENT
Start: 2022-03-10 | End: 2022-03-10 | Stop reason: HOSPADM

## 2022-03-10 RX ORDER — FENTANYL CITRATE 50 UG/ML
50 INJECTION, SOLUTION INTRAMUSCULAR; INTRAVENOUS EVERY 6 HOURS PRN
Status: DISCONTINUED | OUTPATIENT
Start: 2022-03-10 | End: 2022-03-11 | Stop reason: HOSPADM

## 2022-03-10 RX ORDER — ONDANSETRON 2 MG/ML
4 INJECTION INTRAMUSCULAR; INTRAVENOUS
Status: DISCONTINUED | OUTPATIENT
Start: 2022-03-10 | End: 2022-03-10 | Stop reason: HOSPADM

## 2022-03-10 RX ORDER — DEXAMETHASONE SODIUM PHOSPHATE 10 MG/ML
INJECTION INTRAMUSCULAR; INTRAVENOUS PRN
Status: DISCONTINUED | OUTPATIENT
Start: 2022-03-10 | End: 2022-03-10 | Stop reason: SDUPTHER

## 2022-03-10 RX ADMIN — PROTAMINE SULFATE 40 MG: 10 INJECTION, SOLUTION INTRAVENOUS at 14:26

## 2022-03-10 RX ADMIN — SODIUM CHLORIDE: 9 INJECTION, SOLUTION INTRAVENOUS at 09:57

## 2022-03-10 RX ADMIN — NITROGLYCERIN 20 MCG: 20 INJECTION INTRAVENOUS at 14:15

## 2022-03-10 RX ADMIN — NITROGLYCERIN 20 MCG: 20 INJECTION INTRAVENOUS at 14:46

## 2022-03-10 RX ADMIN — NITROGLYCERIN 20 MCG: 20 INJECTION INTRAVENOUS at 14:44

## 2022-03-10 RX ADMIN — NITROGLYCERIN 20 MCG: 20 INJECTION INTRAVENOUS at 14:49

## 2022-03-10 RX ADMIN — PHENYLEPHRINE HYDROCHLORIDE 25 MCG/MIN: 10 INJECTION INTRAVENOUS at 13:23

## 2022-03-10 RX ADMIN — SODIUM CHLORIDE, POTASSIUM CHLORIDE, SODIUM LACTATE AND CALCIUM CHLORIDE: 600; 310; 30; 20 INJECTION, SOLUTION INTRAVENOUS at 14:28

## 2022-03-10 RX ADMIN — Medication 10 MG: at 13:18

## 2022-03-10 RX ADMIN — Medication 50 MCG: at 13:47

## 2022-03-10 RX ADMIN — Medication 3 MG: at 14:40

## 2022-03-10 RX ADMIN — ENOXAPARIN SODIUM 40 MG: 100 INJECTION SUBCUTANEOUS at 17:19

## 2022-03-10 RX ADMIN — DEXTROSE MONOHYDRATE 1000 MG: 5 INJECTION INTRAVENOUS at 13:20

## 2022-03-10 RX ADMIN — ASPIRIN 325 MG: 325 TABLET, COATED ORAL at 20:00

## 2022-03-10 RX ADMIN — FENTANYL CITRATE 25 MCG: 50 INJECTION, SOLUTION INTRAMUSCULAR; INTRAVENOUS at 13:36

## 2022-03-10 RX ADMIN — SODIUM CHLORIDE, PRESERVATIVE FREE 10 ML: 5 INJECTION INTRAVENOUS at 20:00

## 2022-03-10 RX ADMIN — DEXAMETHASONE SODIUM PHOSPHATE 4 MG: 10 INJECTION INTRAMUSCULAR; INTRAVENOUS at 13:30

## 2022-03-10 RX ADMIN — FENTANYL CITRATE 50 MCG: 50 INJECTION, SOLUTION INTRAMUSCULAR; INTRAVENOUS at 13:05

## 2022-03-10 RX ADMIN — FENTANYL CITRATE 25 MCG: 50 INJECTION, SOLUTION INTRAMUSCULAR; INTRAVENOUS at 13:29

## 2022-03-10 RX ADMIN — Medication 5 MG: at 14:51

## 2022-03-10 RX ADMIN — SODIUM CHLORIDE, POTASSIUM CHLORIDE, SODIUM LACTATE AND CALCIUM CHLORIDE: 600; 310; 30; 20 INJECTION, SOLUTION INTRAVENOUS at 17:21

## 2022-03-10 RX ADMIN — ROCURONIUM BROMIDE 50 MG: 10 INJECTION INTRAVENOUS at 13:05

## 2022-03-10 RX ADMIN — NITROGLYCERIN 20 MCG: 20 INJECTION INTRAVENOUS at 14:18

## 2022-03-10 RX ADMIN — SODIUM CHLORIDE: 9 INJECTION, SOLUTION INTRAVENOUS at 16:03

## 2022-03-10 RX ADMIN — PROPOFOL 150 MG: 10 INJECTION, EMULSION INTRAVENOUS at 13:05

## 2022-03-10 RX ADMIN — HEPARIN SODIUM 3000 UNITS: 1000 INJECTION, SOLUTION INTRAVENOUS; SUBCUTANEOUS at 13:46

## 2022-03-10 RX ADMIN — Medication 0.6 MG: at 14:40

## 2022-03-10 RX ADMIN — Medication 5 MG: at 14:48

## 2022-03-10 ASSESSMENT — PULMONARY FUNCTION TESTS
PIF_VALUE: 4
PIF_VALUE: 16
PIF_VALUE: 21
PIF_VALUE: 16
PIF_VALUE: 2
PIF_VALUE: 17
PIF_VALUE: 16
PIF_VALUE: 16
PIF_VALUE: 13
PIF_VALUE: 16
PIF_VALUE: 5
PIF_VALUE: 16
PIF_VALUE: 3
PIF_VALUE: 6
PIF_VALUE: 17
PIF_VALUE: 2
PIF_VALUE: 17
PIF_VALUE: 16
PIF_VALUE: 17
PIF_VALUE: 1
PIF_VALUE: 16
PIF_VALUE: 17
PIF_VALUE: 17
PIF_VALUE: 16
PIF_VALUE: 16
PIF_VALUE: 17
PIF_VALUE: 16
PIF_VALUE: 16
PIF_VALUE: 3
PIF_VALUE: 2
PIF_VALUE: 17
PIF_VALUE: 1
PIF_VALUE: 16
PIF_VALUE: 4
PIF_VALUE: 17
PIF_VALUE: 9
PIF_VALUE: 16
PIF_VALUE: 17
PIF_VALUE: 17
PIF_VALUE: 0
PIF_VALUE: 2
PIF_VALUE: 16
PIF_VALUE: 16
PIF_VALUE: 2
PIF_VALUE: 1
PIF_VALUE: 2
PIF_VALUE: 16
PIF_VALUE: 17
PIF_VALUE: 17
PIF_VALUE: 16
PIF_VALUE: 17
PIF_VALUE: 16
PIF_VALUE: 2
PIF_VALUE: 1
PIF_VALUE: 16
PIF_VALUE: 22
PIF_VALUE: 16
PIF_VALUE: 2
PIF_VALUE: 5
PIF_VALUE: 17
PIF_VALUE: 16
PIF_VALUE: 17
PIF_VALUE: 16
PIF_VALUE: 17
PIF_VALUE: 16
PIF_VALUE: 1
PIF_VALUE: 16
PIF_VALUE: 17
PIF_VALUE: 16
PIF_VALUE: 5
PIF_VALUE: 16
PIF_VALUE: 17
PIF_VALUE: 16
PIF_VALUE: 20
PIF_VALUE: 16
PIF_VALUE: 17
PIF_VALUE: 16
PIF_VALUE: 16
PIF_VALUE: 4
PIF_VALUE: 16
PIF_VALUE: 16
PIF_VALUE: 8
PIF_VALUE: 20
PIF_VALUE: 16
PIF_VALUE: 26
PIF_VALUE: 21
PIF_VALUE: 17
PIF_VALUE: 16
PIF_VALUE: 3
PIF_VALUE: 5
PIF_VALUE: 16
PIF_VALUE: 16
PIF_VALUE: 3

## 2022-03-10 ASSESSMENT — PAIN SCALES - GENERAL
PAINLEVEL_OUTOF10: 0
PAINLEVEL_OUTOF10: 0

## 2022-03-10 ASSESSMENT — LIFESTYLE VARIABLES: SMOKING_STATUS: 0

## 2022-03-10 NOTE — PROGRESS NOTES
Patient admitted, consent signed, all questions answered. Pt ready for procedure. Bed in low position, call light to reach with side rails up 2 of 2. Family at bedside with patient.

## 2022-03-10 NOTE — H&P
VASCULAR SURGERY H&P  [facility]      Patient's Name/ Date of Birth/ Gender: Artemio Mao / 1935 (81 y.o.) / female       History of present Illness: Pt is a 81 y.o. female, presenting today for right-sided carotid endarterectomy. Patient has a CTA which showed a greater than 80% stenosis of her right internal carotid artery. She initially did not wish to proceed with any surgical intervention however having to having talked with her son she changed her mind. Her cardiologist provided clearance prior to this procedure. Past Medical History:  has a past medical history of Allergic rhinitis, Aortic stenosis, Arthritis, History of echocardiogram, History of stress test, Hyperlipidemia, Hypertension, Murmur, Thyroid disease, Wears prescription eyeglasses, and Wellness examination. Past Surgical History:   Past Surgical History:   Procedure Laterality Date    CATARACT REMOVAL      COLON SURGERY      \"intestines ruptured\"    COLONOSCOPY      within last 4 yrs    CYSTOSCOPY  11/23/2020    TUR-BT, MITOMYCIN INSTILLATION     CYSTOSCOPY N/A 11/23/2020    CYSTOSCOPY TUR BLADDER TUMOR, GYRUS, MITOMYCIN INSTILLATION performed by Vidal Nogueira MD at [facility], [address] [place] [address] approx 2017    ILEOSTOMY OR JEJUNOSTOMY      reversal 2001    LUMBAR LAMINECTOMY  x 2    2 lumbar laminectomy    SHOULDER SURGERY      TOTAL KNEE ARTHROPLASTY Bilateral        Social History:  reports that she has quit smoking. She has never used smokeless tobacco. She reports current alcohol use of about 3.0 standard drinks of alcohol per week. She reports that she does not use drugs. Family History: family history includes Diabetes in her mother.     Allergies: Augmentin [amoxicillin-pot clavulanate], Morphine, and Tequin [gatifloxacin]    Current Meds:  Current Facility-Administered Medications:     0.9 % sodium chloride infusion, , IntraVENous, Continuous, James Shell, MD    ceFAZolin (ANCEF) 1000 mg in dextrose 5 % 50 mL IVPB, 1,000 mg, IntraVENous, Once, Epifanio Booker MD    REVIEW OF SYSTEMS:      Review of Systems - General ROS: negative for - chills, fatigue, fever or night sweats  Psychological ROS: negative for - anxiety, behavioral disorder or depression  Ophthalmic ROS: negative for - blurry vision, dry eyes or eye pain  ENT ROS: negative for - epistaxis, headaches or nasal congestion  Hematological and Lymphatic ROS: negative for - bleeding problems, blood clots or bruising  Endocrine ROS: negative for - malaise/lethargy, mood swings or palpitations  Respiratory ROS: negative for - cough, hemoptysis or shortness of breath  Cardiovascular ROS: no chest pain or dyspnea on exertion  Gastrointestinal ROS: no abdominal pain, constipation, hematochezia  Genito-Urinary ROS: no dysuria, trouble voiding, or hematuria  Neurological ROS: TIA or stroke like symptom  Dermatological ROS: Positive for dry skin      PHYSICAL EXAM:    VITALS:  There were no vitals taken for this visit.   INTAKE/OUTPUT:   No intake or output data in the 24 hours ending 03/10/22 0918      CONSTITUTIONAL:  Alert and oriented, no acute distress  HEAD: normocephalic, atraumatic  EYES: Pupils equal and reactive to light, Extraocular muscles intact, sclera non icteric  ENT: Mucus membranes moist, No otorrhea, no rhinorrhea  NECK:  supple, symmetrical, trachea midline   LUNGS:  Good air movement bilaterally, unlabored respirations  CARDIOVASCULAR: Regular rate and rhythm  ABDOMEN: soft, non tender, non distended  MUSCULOSKELETAL:  Equal strength bilaterally, normal muscle tone  SKIN: No abscess or rash  Ext: Palpable radials  NEUROLOGIC:  Cranial nerves 2-12 grossly intact, no focal deficits  PSYCH: affect appropriate      Labs:   Lab Results   Component Value Date    WBC 7.4 12/19/2021    HGB 12.2 12/19/2021    HCT 38.7 12/19/2021    MCV 91.1 12/19/2021     12/19/2021     10/19/2011     Lab Results   Component Value Date     12/19/2021    K 4.1 12/19/2021     12/19/2021    CO2 27 12/19/2021    BUN 30 12/19/2021    CREATININE 0.93 12/19/2021    GLUCOSE 67 12/19/2021    GLUCOSE 123 10/19/2011    CALCIUM 9.0 12/19/2021     Lab Results   Component Value Date    INR 1.0 12/19/2021       Imaging:  Not available      Impression:  Patient Active Problem List   Diagnosis    Malignant neoplasm of overlapping sites of bladder ([facility] [address]    Atrial fibrillation with RVR (HCC)    Rotator cuff arthropathy of right shoulder       1. Presents for right-sided carotid endarterectomy    Recommendation:    1. Risks, benefits, and alternatives were discussed, informed consent was obtained  2. Had cardiac clearance  3.  To proceed with right-sided carotid endarterectomy      Perico Holbrook DO  3/10/2022

## 2022-03-10 NOTE — ANESTHESIA POSTPROCEDURE EVALUATION
Department of Anesthesiology  Postprocedure Note    Patient: Gerard Frey  MRN: [id number]  YOB: 1935  Date of evaluation: 3/10/2022  Time:  6:16 PM     Procedure Summary     Date: 03/10/22 Room / Location: [address]    Anesthesia Start: 8234 Anesthesia Stop: 7665    Procedure: CAROTID ENDARTERECTOMY (BOVINE PATCH X1) (Right ) Diagnosis: (RIGHT CAROTID ARTERY STENOIS)    Surgeons: Nery Turk MD Responsible Provider: Natividad Zuniga MD    Anesthesia Type: general ASA Status: 3          Anesthesia Type: general    Collin Phase I:      Collin Phase II:      Last vitals: Reviewed and per EMR flowsheets.      POST-OP ANESTHESIA NOTE       BP (!) 112/50   Pulse 64   Temp 97.8 °F (36.6 °C) (Oral)   Resp 15   Ht 5' 3\" (1.6 m)   Wt 116 lb (52.6 kg)   SpO2 100%   BMI 20.55 kg/m²                Anesthesia Post Evaluation    Patient location during evaluation: ICU  Patient participation: complete - patient participated  Level of consciousness: awake  Pain score: 0  Airway patency: patent  Nausea & Vomiting: no vomiting and no nausea  Complications: no  Cardiovascular status: hemodynamically stable  Respiratory status: acceptable  Hydration status: stable

## 2022-03-10 NOTE — PROGRESS NOTES
1503: pt arrived from OR. Vitals stable pt Varun@[employer] x 2. drowsy slightly disoriented. ABHILASH luna. 1617: Vascular sx resident notified regarding HR , 41. At bedside assessing pt   1805: pt starting to become agitated. Refusing to take p.o meds. Resident paged  [phone]: pt'S BP low 118/37 map of 62. Awaiting orders.

## 2022-03-10 NOTE — ANESTHESIA PRE PROCEDURE
Department of Anesthesiology  Preprocedure Note       Name:  Kristofer Martin   Age:  81 y.o.  :  1935                                          MRN:  [id number]         Date:  3/10/2022      Surgeon: Blu Rosenberg):  Ezra Awad MD    Procedure: Procedure(s):  CAROTID ENDARTERECTOMY (BOVINE PATCH X1)    Department of Anesthesiology  Pre-Anesthesia Evaluation/Consultation         Name:  Kristofer Martin                                         Age:  81 y.o.   MRN:  [id number]             Medications  Current Facility-Administered Medications   Medication Dose Route Frequency Provider Last Rate Last Admin    0.9 % sodium chloride infusion   IntraVENous Continuous Ezra Awad MD 75 mL/hr at 03/10/22 0957 New Bag at 03/10/22 0957    ceFAZolin (ANCEF) 1,000 mg in dextrose 5 % 50 mL IVPB (mini-bag)  1,000 mg IntraVENous Once Ezra Awad MD           Allergies   Allergen Reactions    Augmentin [Amoxicillin-Pot Clavulanate]     Morphine     Tequin [Gatifloxacin]      Patient Active Problem List   Diagnosis    Malignant neoplasm of overlapping sites of bladder ([address]    Atrial fibrillation with RVR ([address]    Rotator cuff arthropathy of right shoulder     Past Medical History:   Diagnosis Date    Allergic rhinitis     Aortic stenosis 2017    on echo    Arthritis     rt foot    History of echocardiogram 2017    mild MR, aortic stenosis mild, mild TR, mild pulmonary hypertension    History of stress test 2017    \"normal\" per [address] [facility]    Hyperlipidemia     on rx    Hypertension     per Dr. Rafiq Pereira Thyroid disease     Dr. Saumya Baker Wears prescription eyeglasses     Wellness examination     Dr. Aly Wakefield last seen 10/2020     Past Surgical History:   Procedure Laterality Date    CATARACT REMOVAL      COLON SURGERY      \"intestines ruptured\"    COLONOSCOPY      within last 4 yrs    CYSTOSCOPY  2020    TUR-BT, MITOMYCIN INSTILLATION     CYSTOSCOPY N/A 2020 CYSTOSCOPY TUR BLADDER TUMOR, GYRUS, MITOMYCIN INSTILLATION performed by Fermin Montez MD at [address] [place] [address], [address] [place] [address] approx 2017    EYE SURGERY      ILEOSTOMY OR JEJUNOSTOMY      reversal 2001    LUMBAR LAMINECTOMY  x 2    2 lumbar laminectomy    SHOULDER SURGERY      TOTAL KNEE ARTHROPLASTY Bilateral     VASCULAR SURGERY Right 03/10/2022    CEA     Social History     Tobacco Use    Smoking status: Former Smoker    Smokeless tobacco: Never Used    Tobacco comment: smoked for 2-3 years   Vaping Use    Vaping Use: Never used   Substance Use Topics    Alcohol use: Yes     Alcohol/week: 3.0 standard drinks     Types: 3 Glasses of wine per week     Comment: 3 GLASSES OF WINE PER DAY    Drug use: No         Vital Signs (Current)   Vitals:    03/10/22 0945   BP: (!) 136/50   Pulse: (!) 48   Resp: 15   Temp: 98 °F (36.7 °C)   SpO2: 99%     Vital Signs Statistics (for past 48 hrs)     Temp  Av °F (36.7 °C)  Min: 98 °F (36.7 °C)   Min taken time: 03/10/22 0945  Max: 98 °F (36.7 °C)   Max taken time: 03/10/22 0945  Pulse  Av  Min: 50   Min taken time: 03/10/22 0945  Max: 50   Max taken time: 03/10/22 0945  Resp  Avg: 15  Min: 13   Min taken time: 03/10/22 0945  Max: 13   Max taken time: 03/10/22 0945  BP  Min: 136/50   Min taken time: 03/10/22 0945  Max: 136/50   Max taken time: 03/10/22 0945  SpO2  Av %  Min: 99 %   Min taken time: 03/10/22 0945  Max: 99 %   Max taken time: 03/10/22 0945  BP Readings from Last 3 Encounters:   03/10/22 (!) 136/50   21 107/78   21 133/62       BMI  Body mass index is 20.55 kg/m².     CBC   Lab Results   Component Value Date    WBC 7.4 2021    RBC 4.25 2021    RBC 4.14 10/19/2011    HGB 12.2 2021    HCT 38.7 2021    MCV 91.1 2021    RDW 14.8 2021     2021     10/19/2011       CMP    Lab Results   Component Value Date     2021    K 4.1 2021    CL 102 12/19/2021    CO2 27 12/19/2021    BUN 30 12/19/2021    CREATININE 0.98 03/10/2022    CREATININE 0.93 12/19/2021    GFRAA >60 12/19/2021    LABGLOM 54 03/10/2022    GLUCOSE 67 12/19/2021    GLUCOSE 123 10/19/2011    PROT 7.0 12/19/2021    CALCIUM 9.0 12/19/2021    BILITOT 0.20 12/19/2021    ALKPHOS 56 12/19/2021    AST 22 12/19/2021    ALT 16 12/19/2021       BMP    Lab Results   Component Value Date     12/19/2021    K 4.1 12/19/2021     12/19/2021    CO2 27 12/19/2021    BUN 30 12/19/2021    CREATININE 0.98 03/10/2022    CREATININE 0.93 12/19/2021    CALCIUM 9.0 12/19/2021    GFRAA >60 12/19/2021    LABGLOM 54 03/10/2022    GLUCOSE 67 12/19/2021    GLUCOSE 123 10/19/2011       POC Testing  Recent Labs     03/10/22  1001   POCGLU 87   POCNA 145   POCK 4.4   POCCL 103   POCBUN Result not available. 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  Authorizing Provider   metoprolol succinate (TOPROL XL) 25 MG extended release tablet Take 0.5 tablets by mouth daily 6/27/21 3/10/22 Yes Amado Madrigal MD   aspirin 81 MG EC tablet Take 81 mg by mouth daily   Yes Historical Provider, MD   loperamide (IMODIUM) 2 MG capsule Take 2 mg by mouth 4 times daily as needed for Diarrhea    Yes Historical Provider, MD   Calcium Carbonate-Vit D-Min (CALCIUM 1200) 8137-1857 MG-UNIT CHEW Take 1 tablet by mouth daily daily   Yes Historical Provider, MD Probiotic Product (PROBIOTIC-10 PO) Take 1 capsule by mouth daily    Yes Historical Provider, MD   cetirizine (ZYRTEC) 10 MG tablet Take 10 mg by mouth daily   Yes Historical Provider, MD   atorvastatin (LIPITOR) 10 MG tablet Take 10 mg by mouth daily Dr. Marcela Mcmillan Provider, MD   levothyroxine (SYNTHROID) 100 MCG tablet Take 100 mcg by mouth Daily  [facility]   Yes Historical Provider, MD       Current medications:    Current Facility-Administered Medications   Medication Dose Route Frequency Provider Last Rate Last Admin    0.9 % sodium chloride infusion   IntraVENous Continuous Debbie Pandey MD 75 mL/hr at 03/10/22 0957 New Bag at 03/10/22 0957    ceFAZolin (ANCEF) 1,000 mg in dextrose 5 % 50 mL IVPB (mini-bag)  1,000 mg IntraVENous Once Debbie Pandey MD           Allergies:     Allergies   Allergen Reactions    Augmentin [Amoxicillin-Pot Clavulanate]     Morphine     Tequin [Gatifloxacin]        Problem List:    Patient Active Problem List   Diagnosis Code    Malignant neoplasm of overlapping sites of bladder ([facility] C67.8    Atrial fibrillation with RVR (HCC) I48.91    Rotator cuff arthropathy of right shoulder M12.811       Past Medical History:        Diagnosis Date    Allergic rhinitis     Aortic stenosis 2017    on echo    Arthritis     rt foot    History of echocardiogram 2017    mild MR, aortic stenosis mild, mild TR, mild pulmonary hypertension    History of stress test 2017    \"normal\" per [facility]    Hyperlipidemia     on rx    Hypertension     per Dr. Jeanie Nyhan Thyroid disease     Dr. Juliano Brown Wears prescription eyeglasses     Wellness examination      [facility] last seen 10/2020       Past Surgical History:        Procedure Laterality Date    CATARACT REMOVAL      COLON SURGERY      \"intestines ruptured\"    COLONOSCOPY      within last 4 yrs    CYSTOSCOPY  11/23/2020    TUR-BT, MITOMYCIN INSTILLATION     CYSTOSCOPY N/A 11/23/2020 CYSTOSCOPY TUR BLADDER TUMOR, GYRUS, MITOMYCIN INSTILLATION performed by Elisa Mei MD at [address], [facility] [place] approx 2017    EYE SURGERY      ILEOSTOMY OR JEJUNOSTOMY      reversal 2001    LUMBAR LAMINECTOMY  x 2    2 lumbar laminectomy    SHOULDER SURGERY      TOTAL KNEE ARTHROPLASTY Bilateral     VASCULAR SURGERY Right 03/10/2022    CEA       Social History:    Social History     Tobacco Use    Smoking status: Former Smoker    Smokeless tobacco: Never Used    Tobacco comment: smoked for 2-3 years   Substance Use Topics    Alcohol use: Yes     Alcohol/week: 3.0 standard drinks     Types: 3 Glasses of wine per week     Comment: 3 GLASSES OF WINE PER DAY                                Counseling given: Not Answered  Comment: smoked for 2-3 years      Vital Signs (Current):   Vitals:    03/10/22 0945   BP: (!) 136/50   Pulse: (!) 48   Resp: 15   Temp: 98 °F (36.7 °C)   TempSrc: Oral   SpO2: 99%   Weight: 116 lb (52.6 kg)   Height: 5' 3\" (1.6 m)                                              BP Readings from Last 3 Encounters:   03/10/22 (!) 136/50   12/19/21 107/78   08/09/21 133/62       NPO Status: Time of last liquid consumption: 2330                        Time of last solid consumption: 1900                        Date of last liquid consumption: 03/09/22                        Date of last solid food consumption: 03/09/22    BMI:   Wt Readings from Last 3 Encounters:   03/10/22 116 lb (52.6 kg)   12/19/21 115 lb (52.2 kg)   08/09/21 111 lb (50.3 kg)     Body mass index is 20.55 kg/m².     CBC:   Lab Results   Component Value Date    WBC 7.4 12/19/2021    RBC 4.25 12/19/2021    RBC 4.14 10/19/2011    HGB 12.2 12/19/2021    HCT 38.7 12/19/2021    MCV 91.1 12/19/2021    RDW 14.8 12/19/2021     12/19/2021     10/19/2011       CMP:   Lab Results   Component Value Date     12/19/2021    K 4.1 12/19/2021     12/19/2021    CO2 27 12/19/2021 BUN 30 12/19/2021    CREATININE 0.98 03/10/2022    CREATININE 0.93 12/19/2021    GFRAA >60 12/19/2021    LABGLOM 54 03/10/2022    GLUCOSE 67 12/19/2021    GLUCOSE 123 10/19/2011    PROT 7.0 12/19/2021    CALCIUM 9.0 12/19/2021    BILITOT 0.20 12/19/2021    ALKPHOS 56 12/19/2021    AST 22 12/19/2021    ALT 16 12/19/2021       POC Tests:   Recent Labs     03/10/22  1001   POCGLU 87   POCNA 145   POCK 4.4   POCCL 103   POCBUN Result not available.    POCHEMO 12.6   POCHCT 37       Coags:   Lab Results   Component Value Date    PROTIME 13.0 12/19/2021    INR 1.0 12/19/2021       HCG (If Applicable): No results found for: PREGTESTUR, PREGSERUM, HCG, HCGQUANT     ABGs: No results found for: PHART, PO2ART, TMA5EUX, LBB8SRF, BEART, W4LGLKOS     Type & Screen (If Applicable):  No results found for: LABABO, LABRH    Drug/Infectious Status (If Applicable):  No results found for: HIV, HEPCAB    COVID-19 Screening (If Applicable):   Lab Results   Component Value Date    COVID19 Not Detected 11/19/2020     EKG 12/19/2021  Sinus bradycardia with Premature atrial complexes  Anteroseptal infarct (cited on or before 26-JUN-2021)  T wave abnormality, consider inferior ischemia  Abnormal ECG  When compared with ECG of 09-AUG-2021 04:27,  Premature atrial complexes are now Present  Questionable change in initial forces of Anterior leads  Non-specific change in ST segment in Anterior leads        Anesthesia Evaluation   no history of anesthetic complications:   Airway: Mallampati: II  TM distance: >3 FB   Neck ROM: full  Mouth opening: > = 3 FB Dental:    (+) other      Pulmonary:Negative Pulmonary ROS and normal exam        (-) recent URI and not a current smoker                           Cardiovascular:  Exercise tolerance: good (>4 METS),   (+) hypertension:, valvular problems/murmurs: AS, dysrhythmias: atrial fibrillation,                   Neuro/Psych:   Negative Neuro/Psych ROS     (-) seizures and CVA            ROS comment: microvascular disease GI/Hepatic/Renal:        (-) GERD       Endo/Other:    (+) hypothyroidism: arthritis:., malignancy/cancer. (-) diabetes mellitus               Abdominal:             Vascular:   + PVD, aortic or cerebral, . ROS comment: mi. Other Findings:             Anesthesia Plan      general     ASA 3     (Asa 3 ET)  Induction: intravenous. arterial line  MIPS: Postoperative opioids intended. Anesthetic plan and risks discussed with patient and child/children. Plan discussed with CRNA.                 Dianna Montemayor MD   3/10/2022

## 2022-03-11 VITALS
HEIGHT: 63 IN | HEART RATE: 60 BPM | RESPIRATION RATE: 12 BRPM | TEMPERATURE: 98.1 F | WEIGHT: 116 LBS | DIASTOLIC BLOOD PRESSURE: 64 MMHG | SYSTOLIC BLOOD PRESSURE: 110 MMHG | BODY MASS INDEX: 20.55 KG/M2 | OXYGEN SATURATION: 98 %

## 2022-03-11 LAB
ABSOLUTE EOS #: <0.03 K/UL (ref 0–0.44)
ABSOLUTE IMMATURE GRANULOCYTE: 0.03 K/UL (ref 0–0.3)
ABSOLUTE LYMPH #: 0.85 K/UL (ref 1.1–3.7)
ABSOLUTE MONO #: 1.04 K/UL (ref 0.1–1.2)
ANION GAP SERPL CALCULATED.3IONS-SCNC: 12 MMOL/L (ref 9–17)
BASOPHILS # BLD: 0 % (ref 0–2)
BASOPHILS ABSOLUTE: <0.03 K/UL (ref 0–0.2)
BUN BLDV-MCNC: 25 MG/DL (ref 8–23)
CALCIUM SERPL-MCNC: 8.1 MG/DL (ref 8.6–10.4)
CHLORIDE BLD-SCNC: 108 MMOL/L (ref 98–107)
CO2: 21 MMOL/L (ref 20–31)
CREAT SERPL-MCNC: 0.87 MG/DL (ref 0.5–0.9)
EOSINOPHILS RELATIVE PERCENT: 0 % (ref 1–4)
GFR AFRICAN AMERICAN: >60 ML/MIN
GFR NON-AFRICAN AMERICAN: >60 ML/MIN
GFR SERPL CREATININE-BSD FRML MDRD: ABNORMAL ML/MIN/{1.73_M2}
GLUCOSE BLD-MCNC: 144 MG/DL (ref 70–99)
HCT VFR BLD CALC: 35.6 % (ref 36.3–47.1)
HEMOGLOBIN: 11 G/DL (ref 11.9–15.1)
IMMATURE GRANULOCYTES: 0 %
LYMPHOCYTES # BLD: 8 % (ref 24–43)
MCH RBC QN AUTO: 29.1 PG (ref 25.2–33.5)
MCHC RBC AUTO-ENTMCNC: 30.9 G/DL (ref 28.4–34.8)
MCV RBC AUTO: 94.2 FL (ref 82.6–102.9)
MONOCYTES # BLD: 10 % (ref 3–12)
NRBC AUTOMATED: 0 PER 100 WBC
PDW BLD-RTO: 13.6 % (ref 11.8–14.4)
PLATELET # BLD: 164 K/UL (ref 138–453)
PMV BLD AUTO: 12 FL (ref 8.1–13.5)
POTASSIUM SERPL-SCNC: 5 MMOL/L (ref 3.7–5.3)
RBC # BLD: 3.78 M/UL (ref 3.95–5.11)
SEG NEUTROPHILS: 82 % (ref 36–65)
SEGMENTED NEUTROPHILS ABSOLUTE COUNT: 8.96 K/UL (ref 1.5–8.1)
SODIUM BLD-SCNC: 141 MMOL/L (ref 135–144)
WBC # BLD: 10.9 K/UL (ref 3.5–11.3)

## 2022-03-11 PROCEDURE — 6370000000 HC RX 637 (ALT 250 FOR IP): Performed by: STUDENT IN AN ORGANIZED HEALTH CARE EDUCATION/TRAINING PROGRAM

## 2022-03-11 PROCEDURE — 6360000002 HC RX W HCPCS: Performed by: STUDENT IN AN ORGANIZED HEALTH CARE EDUCATION/TRAINING PROGRAM

## 2022-03-11 PROCEDURE — 85025 COMPLETE CBC W/AUTO DIFF WBC: CPT

## 2022-03-11 PROCEDURE — 7100000001 HC PACU RECOVERY - ADDTL 15 MIN

## 2022-03-11 PROCEDURE — 80048 BASIC METABOLIC PNL TOTAL CA: CPT

## 2022-03-11 PROCEDURE — 2580000003 HC RX 258: Performed by: STUDENT IN AN ORGANIZED HEALTH CARE EDUCATION/TRAINING PROGRAM

## 2022-03-11 PROCEDURE — 7100000000 HC PACU RECOVERY - FIRST 15 MIN

## 2022-03-11 PROCEDURE — 36415 COLL VENOUS BLD VENIPUNCTURE: CPT

## 2022-03-11 RX ORDER — HYDROCODONE BITARTRATE AND ACETAMINOPHEN 5; 325 MG/1; MG/1
1 TABLET ORAL EVERY 6 HOURS PRN
Qty: 20 TABLET | Refills: 0 | Status: SHIPPED | OUTPATIENT
Start: 2022-03-11 | End: 2022-03-16

## 2022-03-11 RX ADMIN — Medication 81 MG: at 09:21

## 2022-03-11 RX ADMIN — ENOXAPARIN SODIUM 40 MG: 100 INJECTION SUBCUTANEOUS at 09:22

## 2022-03-11 RX ADMIN — ACETAMINOPHEN 1000 MG: 500 TABLET ORAL at 09:21

## 2022-03-11 RX ADMIN — SODIUM CHLORIDE, POTASSIUM CHLORIDE, SODIUM LACTATE AND CALCIUM CHLORIDE: 600; 310; 30; 20 INJECTION, SOLUTION INTRAVENOUS at 04:54

## 2022-03-11 RX ADMIN — ACETAMINOPHEN 1000 MG: 500 TABLET ORAL at 00:22

## 2022-03-11 ASSESSMENT — PAIN SCALES - GENERAL
PAINLEVEL_OUTOF10: 0

## 2022-03-11 NOTE — PROGRESS NOTES
1145: Discharge instructions reviewed with pt, Medication uploaded on AVS. All education completed with pt. Follow up appointment reviewed and all belongings returned with pt.

## 2022-03-11 NOTE — CARE COORDINATION
Case Management Initial Discharge Plan  Artemio Mao,         Pt A&O X 3    Met with:patient to discuss discharge plans. Information verified: address, contacts, phone number, , insurance Yes  Insurance Provider: [employer]    Emergency Contact/Next of Kin name & number: Piero Prater, daughter [phone] and son Deondre Corrales [phone]  Who are involved in patient's support system? children    PCP: Tarsha Shultz MD  Date of last visit: not sure      Discharge Planning    Living Arrangements:  Alone     Home has 1 story with a ramp      Patient able to perform ADL's:Independent    Current Services (outpatient & in home) none  DME equipment: none  DME provider: n/a    Is patient receiving oral anticoagulation therapy? No    If indicated:   Physician managing anticoagulation treatment:   Where does patient obtain lab work for ATC treatment? Potential Assistance Needed:  N/A    Patient agreeable to home care: No  [place] of choice provided:  n/a    Prior SNF/Rehab Placement and Facility: none  Agreeable to SNF/Rehab: No  [place] of choice provided: n/a     Evaluation: no    Expected Discharge date:  22    Patient expects to be discharged to: If home: is the family and/or caregiver wiling & able to provide support at home? yes  Who will be providing this support? children*    Follow Up Appointment: Best Day/ Time:      Transportation provider: friend Pepper Gold  Transportation arrangements needed for discharge: No    Readmission Risk              Risk of Unplanned Readmission:  11             Does patient have a readmission risk score greater than 14?: No  If yes, follow-up appointment must be made within 7 days of discharge. Goals of Care: Revascularization to brain      Educated pt on transitional options, provided freedom of choice and are agreeable with plan      Discharge Plan: Home with children support.  They live around the corner from her.          Electronically signed by Kim Vicente RN on 3/11/22 at 9:49 AM EST

## 2022-03-11 NOTE — PROGRESS NOTES
Vascular Surgery Progress Note            PATIENT NAME: Dave Jeff     TODAY'S DATE: 3/11/2022, 7:42 AM    CC: post op pain    SUBJECTIVE:    Pt seen and examined. S/p right sided carotid endarterectomy. Pain well controlled. No acute event overnight. Moderate swelling along incision. In great spirit. OBJECTIVE:   VITALS:  BP (!) 117/42   Pulse 51   Temp 98 °F (36.7 °C) (Oral)   Resp 12   Ht 5' 3\" (1.6 m)   Wt 116 lb (52.6 kg)   SpO2 98%   BMI 20.55 kg/m²  I Temperature Max - Temp  Av.5 °F (35.8 °C)  Min: 76.7 °F (24.8 °C)  Max: 98 °F (36.7 °C)      LABS:  Lab Results   Component Value Date    WBC 10.9 2022    HGB 11.0 2022    HCT 35.6 2022     2022     10/19/2011       Lab Results   Component Value Date     2021    K 4.1 2021     2021    CO2 27 2021    BUN 30 2021    CREATININE 0.98 03/10/2022    CREATININE 0.93 2021    GLUCOSE 67 2021    GLUCOSE 123 10/19/2011       Lab Results   Component Value Date    ALKPHOS 56 2021    ALT 16 2021    AST 22 2021    PROT 7.0 2021    BILITOT 0.20 2021    BILIDIR 0.10 2012    IBILI 0.26 2012    LABALBU 4.1 2021       Lab Results   Component Value Date    PROTIME 13.0 2021    INR 1.0 2021       Calcium:    Lab Results   Component Value Date    CALCIUM 9.0 2021     Ionized Calcium:  No results found for: IONCA  Magnesium:    Lab Results   Component Value Date    MG 1.8 2021     Phosphorus:  No results found for: PHOS  Albumin:    Lab Results   Component Value Date    LABALBU 4.1 2021         General: AOx3, NAD  Heart: Regular rate and rhythm   Lungs: Clear to auscultation bilaterally  Abdomen: Soft, nontender, nondistended. Bowel sounds x4. Incision: clean and copated. No hematoma noted. Neurological: cranial nerve intact. ASSESSMENT   1.  S/p right sided thrombectomy PLAN  1. Continue ASA and Lovenox   2. Monitor neck hematoma   3. Monitor focal neurological deficiency  4.  Continue pain management     Electronically signed by Jessica Fernandez DPM on 3/11/2022 at 7:50 AM

## 2022-03-11 NOTE — OP NOTE
Operative Note      Patient: Aye Olea  YOB: 1935  MRN: [id number]    Date of Procedure: 3/10/2022    Pre-Op Diagnosis: RIGHT CAROTID ARTERY STENOIS    Post-Op Diagnosis: Same       Procedure(s):  CAROTID ENDARTERECTOMY (BOVINE PATCH X1)    Surgeon(s):  Noa Alanis MD    Assistant:   * No surgical staff found *    Anesthesia: General    Estimated Blood Loss (mL): less than 50     Complications: None    Specimens:   ID Type Source Tests Collected by Time Destination   A : RIGHT CAROTID ARTERY PLAQUE  Tissue Carotid Arteries SURGICAL PATHOLOGY Noa Alanis MD 3/10/2022 1431        Implants:  * No implants in log *      Drains:   [REMOVED] Urethral Catheter Double-lumen 18 fr (Removed)       Findings: Patient was moving all extremities to command on completion of procedure    Detailed Description of Procedure: This is an 60-year-old female who was noted to have high-grade asymptomatic stenosis of 80% shown on CTA of the neck. She presents for elective right carotid endarterectomy. The patient was brought to the operating room placed supine the right neck was cleaned and prepped and sterile drapes were applied. An incision was made along the anterior border the sternocleidomastoid muscle. Until the right internal jugular vein was identified and exposed. The common facial vein was ligated and divided and clips were placed. Retracting this laterally allowed for exposure of the internal carotid artery. Circumferential dissection was performed from the common carotid to the internal and external carotid. The hypoglossal nerve and vagus nerve were identified and preserved. The patient was heparinized with 5000 units of heparin. After 3 minutes of circulation time the internal carotid was clamped followed by the common and then external carotid arteries. Shunt was placed to preserve cerebral perfusion and flow through the shunt was confirmed with continuous-wave Doppler.   Carotid endarterectomy was performed. The majority of the plaque was at the carotid bifurcation and healthy portion of the internal carotid was reached distally. No tacking sutures were required. Bovine pericardial patch was used performed a patch angioplasty with running 6-0 Prolene suture. The shunt was then removed and clamps were replaced. The internal carotid was flushed and heparinized saline was used to flush the entire arterial bed. The external carotid was released first followed by the common and internal carotid. There was excellent hemostasis. The patient was given protamine to reverse her heparinization. The platysma was closed followed by dermal layer and then a subcuticular layer and the skin and Dermabond was applied. Upon reversal of anesthesia she woke with no gross neurologic deficits moving all extremities to command.   She returned to ICU in stable condition    Electronically signed by Gabriella Leong MD on 3/11/2022 at 7:50 AM

## 2022-03-14 LAB — SURGICAL PATHOLOGY REPORT: NORMAL

## 2022-03-15 NOTE — DISCHARGE SUMMARY
VASCULAR SURGERY DISCHARGE SUMMARY:    Disposition: DISCHARGE TO home    PATIENT NAME: Analia Dubon    YOB: 1935  MEDICAL RECORD NO. [id number]  DATE: 3/15/2022  ADMITTING PHYSICIAN: Dr. Parra House: Olga Grigsby MD  DISCHARGE DATE:  3/11/2022 12:08 PM  DISPOSITION: to home  ADMITTING DIAGNOSIS:   1. S/P carotid endarterectomy      DISCHARGE DIAGNOSIS:   Patient Active Problem List   Diagnosis Code    Malignant neoplasm of overlapping sites of bladder ([facility] [address] C67.8    Atrial fibrillation with RVR (HCC) I48.91    Rotator cuff arthropathy of right shoulder M12.811    S/P carotid endarterectomy Z98.890     CONSULTANTS:  none    PROCEDURES:  Right sided carotid endarterectomy     DIAGNOSTIC TESTS:    No results found. HOSPITAL COURSE:   Analia Dubon is a 82 yo female who underwent right sided carotid endometrectomy  on 3/10/22. Pt was admitted post-operatively for further evaluation and mgmt. Labs and images were followed daily throughout entired admission. Post-operatively pt's pain was controlled w/ IV pain medications and transitioned to PO as tolerated. CLD started and advanced as tolerated. Pt was ambulatory w/o assistance. At time of discharge, Analia Dubon was tolerating a regular diet, ambulating on her own accord, had adequate analgesia on oral pain medications, and had no signs of symptoms of complications. She was deemed medically stable and was DC to home on  norco w/ instructions to f/u in 1 weeks. Pt expressed understanding of and agreement w/ DC instructions.       LABS:     Lab Results   Component Value Date     WBC 10.9 03/11/2022     HGB 11.0 03/11/2022     HCT 35.6 03/11/2022      03/11/2022      10/19/2011         Lab Results   Component Value Date      12/19/2021     K 4.1 12/19/2021      12/19/2021     CO2 27 12/19/2021     BUN 30 12/19/2021     CREATININE 0.98 03/10/2022     CREATININE 0.93 12/19/2021   GLUCOSE 67 12/19/2021     GLUCOSE 123 10/19/2011               Lab Results   Component Value Date     ALKPHOS 56 12/19/2021     ALT 16 12/19/2021     AST 22 12/19/2021     PROT 7.0 12/19/2021     BILITOT 0.20 12/19/2021     BILIDIR 0.10 06/12/2012     IBILI 0.26 06/12/2012     LABALBU 4.1 12/19/2021               Lab Results   Component Value Date     PROTIME 13.0 12/19/2021     INR 1.0 12/19/2021         Calcium:          Lab Results   Component Value Date     CALCIUM 9.0 12/19/2021      Ionized Calcium:  No results found for: IONCA  Magnesium:          Lab Results   Component Value Date     MG 1.8 06/27/2021      Phosphorus:  No results found for: PHOS  Albumin:          Lab Results   Component Value Date     LABALBU 4.1 12/19/2021        DIAGNOSTIC TESTS:     No results found. DISCHARGE INSTRUCTIONS:      Discharge Medications:        Medication List      START taking these medications    HYDROcodone-acetaminophen 5-325 MG per tablet  Commonly known as: Norco  Take 1 tablet by mouth every 6 hours as needed for Pain for up to 5 days. Intended supply: 3 days.  Take lowest dose possible to manage pain        CONTINUE taking these medications    aspirin 81 MG EC tablet     atorvastatin 10 MG tablet  Commonly known as: LIPITOR     Calcium 1200 7689-2210 MG-UNIT Chew     cetirizine 10 MG tablet  Commonly known as: ZYRTEC     levothyroxine 100 MCG tablet  Commonly known as: SYNTHROID     loperamide 2 MG capsule  Commonly known as: IMODIUM     metoprolol succinate 25 MG extended release tablet  Commonly known as: TOPROL XL  Take 0.5 tablets by mouth daily     PROBIOTIC-10 PO        STOP taking these medications    losartan 25 MG tablet  Commonly known as: COZAAR     rivaroxaban 15 MG Tabs tablet  Commonly known as: Inell Cumming           Where to Get Your Medications      These medications were sent to [address] [phone]  [address] [place] [address] [place] [address]    Phone: [phone]   · HYDROcodone-acetaminophen 5-325 MG per tablet       Diet: Regular diet as tolerated  Activity: - Avoid strenuous activity or exercise until cleared during follow-up appointment  - No driving or operating heavy machinery while taking narcotics   Wound Care: Daily and as needed  Follow-up:   Call [facility] [phone]  for follow up appointment with Dr. Kelly Alves as soon as possible    Nicolle Raymond DPM  3/15/2022, 11:29 AM

## 2022-05-15 ENCOUNTER — APPOINTMENT (OUTPATIENT)
Dept: GENERAL RADIOLOGY | Age: 87
End: 2022-05-15
Payer: MEDICARE

## 2022-05-15 ENCOUNTER — HOSPITAL ENCOUNTER (EMERGENCY)
Age: 87
Discharge: HOME | End: 2022-05-15
Attending: EMERGENCY MEDICINE
Payer: MEDICARE

## 2022-05-15 VITALS
SYSTOLIC BLOOD PRESSURE: 134 MMHG | OXYGEN SATURATION: 99 % | TEMPERATURE: 97.4 F | RESPIRATION RATE: 14 BRPM | HEART RATE: 54 BPM | WEIGHT: 115 LBS | BODY MASS INDEX: 19.16 KG/M2 | DIASTOLIC BLOOD PRESSURE: 43 MMHG | HEIGHT: 65 IN

## 2022-05-15 DIAGNOSIS — K92.0 HEMATEMESIS WITHOUT NAUSEA: Primary | ICD-10-CM

## 2022-05-15 LAB
ABSOLUTE EOS #: 0.21 K/UL (ref 0–0.44)
ABSOLUTE IMMATURE GRANULOCYTE: 0.02 K/UL (ref 0–0.3)
ABSOLUTE LYMPH #: 1.48 K/UL (ref 1.1–3.7)
ABSOLUTE MONO #: 0.71 K/UL (ref 0.1–1.2)
ALBUMIN SERPL-MCNC: 4 G/DL (ref 3.5–5.2)
ALP BLD-CCNC: 56 U/L (ref 35–104)
ALT SERPL-CCNC: 10 U/L (ref 5–33)
ANION GAP SERPL CALCULATED.3IONS-SCNC: 9 MMOL/L (ref 9–17)
AST SERPL-CCNC: 17 U/L
BASOPHILS # BLD: 1 % (ref 0–2)
BASOPHILS ABSOLUTE: 0.05 K/UL (ref 0–0.2)
BILIRUB SERPL-MCNC: 0.63 MG/DL (ref 0.3–1.2)
BUN BLDV-MCNC: 31 MG/DL (ref 8–23)
BUN/CREAT BLD: 33 (ref 9–20)
CALCIUM SERPL-MCNC: 8.9 MG/DL (ref 8.6–10.4)
CHLORIDE BLD-SCNC: 102 MMOL/L (ref 98–107)
CO2: 29 MMOL/L (ref 20–31)
CREAT SERPL-MCNC: 0.95 MG/DL (ref 0.5–0.9)
DATE, STOOL #1: NORMAL
EOSINOPHILS RELATIVE PERCENT: 3 % (ref 1–4)
GFR AFRICAN AMERICAN: >60 ML/MIN
GFR NON-AFRICAN AMERICAN: 56 ML/MIN
GFR SERPL CREATININE-BSD FRML MDRD: ABNORMAL ML/MIN/{1.73_M2}
GLUCOSE BLD-MCNC: 127 MG/DL (ref 70–99)
HCT VFR BLD CALC: 40.5 % (ref 36.3–47.1)
HEMOCCULT SP1 STL QL: NEGATIVE
HEMOGLOBIN: 12.7 G/DL (ref 11.9–15.1)
IMMATURE GRANULOCYTES: 0 %
LYMPHOCYTES # BLD: 21 % (ref 24–43)
MCH RBC QN AUTO: 28.3 PG (ref 25.2–33.5)
MCHC RBC AUTO-ENTMCNC: 31.4 G/DL (ref 28.4–34.8)
MCV RBC AUTO: 90.2 FL (ref 82.6–102.9)
MONOCYTES # BLD: 10 % (ref 3–12)
NRBC AUTOMATED: 0 PER 100 WBC
PDW BLD-RTO: 14 % (ref 11.8–14.4)
PLATELET # BLD: 209 K/UL (ref 138–453)
PMV BLD AUTO: 11.2 FL (ref 8.1–13.5)
POTASSIUM SERPL-SCNC: 4 MMOL/L (ref 3.7–5.3)
RBC # BLD: 4.49 M/UL (ref 3.95–5.11)
SEG NEUTROPHILS: 65 % (ref 36–65)
SEGMENTED NEUTROPHILS ABSOLUTE COUNT: 4.56 K/UL (ref 1.5–8.1)
SODIUM BLD-SCNC: 140 MMOL/L (ref 135–144)
TIME, STOOL #1: 1510
TOTAL PROTEIN: 6.6 G/DL (ref 6.4–8.3)
TROPONIN, HIGH SENSITIVITY: 23 NG/L (ref 0–14)
WBC # BLD: 7 K/UL (ref 3.5–11.3)

## 2022-05-15 PROCEDURE — 85025 COMPLETE CBC W/AUTO DIFF WBC: CPT

## 2022-05-15 PROCEDURE — 93005 ELECTROCARDIOGRAM TRACING: CPT | Performed by: EMERGENCY MEDICINE

## 2022-05-15 PROCEDURE — 80053 COMPREHEN METABOLIC PANEL: CPT

## 2022-05-15 PROCEDURE — 84484 ASSAY OF TROPONIN QUANT: CPT

## 2022-05-15 PROCEDURE — 71045 X-RAY EXAM CHEST 1 VIEW: CPT

## 2022-05-15 PROCEDURE — 99285 EMERGENCY DEPT VISIT HI MDM: CPT

## 2022-05-15 PROCEDURE — 82270 OCCULT BLOOD FECES: CPT

## 2022-05-15 PROCEDURE — 2580000003 HC RX 258: Performed by: EMERGENCY MEDICINE

## 2022-05-15 RX ORDER — 0.9 % SODIUM CHLORIDE 0.9 %
250 INTRAVENOUS SOLUTION INTRAVENOUS ONCE
Status: COMPLETED | OUTPATIENT
Start: 2022-05-15 | End: 2022-05-15

## 2022-05-15 RX ADMIN — SODIUM CHLORIDE 250 ML: 9 INJECTION, SOLUTION INTRAVENOUS at 15:33

## 2022-05-15 ASSESSMENT — PAIN - FUNCTIONAL ASSESSMENT: PAIN_FUNCTIONAL_ASSESSMENT: NONE - DENIES PAIN

## 2022-05-15 NOTE — ED PROVIDER NOTES
EMERGENCY DEPARTMENT ENCOUNTER    Pt Name: Adeline Valero  MRN: [id number]  [place] 1935  Date of evaluation: 5/15/22  CHIEF COMPLAINT       Chief Complaint   Patient presents with    Hematemesis     early this am, moderate amount of blood with vomit, nothing since, being treated for bladder ca, last tx 5 days ago      [address]   Is an 64-year-old female with PMH of aortic stenosis, and hypertension who is brought in by daughter for 1 episode of bloody emesis early this morning. Patient woke up to use the restroom. She subsequently felt nauseated and had 1 episode of bright red bloody emesis. She had normal bowel movement just prior without noting any blood. No other episodes happened throughout the day today. States she feels a little \"blah \", however nothing specific. No fevers, cough, shortness of breath, chest pain, abdominal pain, changes in urine. She reports drinking 3 glasses of wine per day. REVIEW OF SYSTEMS     Review of Systems   All other systems reviewed and are negative.     PASTMEDICAL HISTORY     Past Medical History:   Diagnosis Date    Allergic rhinitis     Aortic stenosis 2017    on echo    Arthritis     rt foot    History of echocardiogram 2017    mild MR, aortic stenosis mild, mild TR, mild pulmonary hypertension    History of stress test 2017    \"normal\" per [address] [facility]    Hyperlipidemia     on rx    Hypertension     per Dr. Baptiste Bras Thyroid disease     Dr. Mina Garcia Wears prescription eyeglasses     Wellness examination     Dr. Epperson last seen 10/2020     SURGICAL HISTORY       Past Surgical History:   Procedure Laterality Date    CAROTID ENDARTERECTOMY Right 3/10/2022    CAROTID ENDARTERECTOMY (BOVINE PATCH X1) performed by Aster Gr MD at [address] [facility] ruptured\"    COLONOSCOPY      within last 4 yrs    CYSTOSCOPY  11/23/2020    TUR-BT, MITOMYCIN INSTILLATION  CYSTOSCOPY N/A 2020    CYSTOSCOPY TUR BLADDER TUMOR, GYRUS, MITOMYCIN INSTILLATION performed by Dinora Brito MD at [address] approx 2017    EYE SURGERY      ILEOSTOMY OR JEJUNOSTOMY      reversal 2001    LUMBAR LAMINECTOMY  x 2    2 lumbar laminectomy    SHOULDER SURGERY      TOTAL KNEE ARTHROPLASTY Bilateral     VASCULAR SURGERY Right 03/10/2022    [facility]     CURRENT MEDICATIONS       Previous Medications    ASPIRIN 81 MG EC TABLET    Take 81 mg by mouth daily    ATORVASTATIN (LIPITOR) 10 MG TABLET    Take 10 mg by mouth daily Dr. Vale Mcgill D-MIN (CALCIUM 1200) 2944-1444 MG-UNIT CHEW    Take 1 tablet by mouth daily daily    CETIRIZINE (ZYRTEC) 10 MG TABLET    Take 10 mg by mouth daily    LEVOTHYROXINE (SYNTHROID) 100 MCG TABLET    Take 100 mcg by mouth Daily  [facility]    LOPERAMIDE (IMODIUM) 2 MG CAPSULE    Take 2 mg by mouth 4 times daily as needed for Diarrhea     METOPROLOL SUCCINATE (TOPROL XL) 25 MG EXTENDED RELEASE TABLET    Take 0.5 tablets by mouth daily    PROBIOTIC PRODUCT (PROBIOTIC-10 PO)    Take 1 capsule by mouth daily      ALLERGIES     is allergic to augmentin [amoxicillin-pot clavulanate], morphine, and tequin [gatifloxacin]. FAMILY HISTORY     She indicated that her mother is . She indicated that her father is . SOCIAL HISTORY       Social History     Tobacco Use    Smoking status: Former Smoker    Smokeless tobacco: Never Used    Tobacco comment: smoked for 2-3 years   Vaping Use    Vaping Use: Never used   Substance Use Topics    Alcohol use:  Yes     Alcohol/week: 3.0 standard drinks     Types: 3 Glasses of wine per week     Comment: 3 GLASSES OF WINE PER DAY    Drug use: No     PHYSICAL EXAM     INITIAL VITALS: BP (!) 134/43   Pulse 54   Temp 97.4 °F (36.3 °C)   Resp 14   Ht 5' 5\" (1.651 m)   Wt 115 lb (52.2 kg)   SpO2 99%   BMI 19.14 kg/m²    Physical Exam  HENT:      Head: Normocephalic. Right Ear: External ear normal.      Left Ear: External ear normal.      Nose: Nose normal.   Eyes:      Conjunctiva/sclera: Conjunctivae normal.   Cardiovascular:      Rate and Rhythm: Regular rhythm. Bradycardia present. Heart sounds: Normal heart sounds. Pulmonary:      Effort: Pulmonary effort is normal.      Breath sounds: Normal breath sounds. Abdominal:      General: Abdomen is flat. Palpations: Abdomen is soft. Genitourinary:     Rectum: Guaiac result negative. Skin:     General: Skin is dry. Neurological:      Mental Status: She is alert. Mental status is at baseline. Psychiatric:         Mood and Affect: Mood normal.         Behavior: Behavior normal.         MEDICAL DECISION MAKING:   The patient is bradycardic, afebrile, nontoxic-appearing. Physical exam unremarkable. Based on history and exam low suspicion for GI bleed. Not on anticoagulants. She does have bradycardia, she is on Lopressor. ED plan for basic labs, troponin, chest x-ray, stool guaiac, reassess          DIAGNOSTIC RESULTS   EKG:All EKG's are interpreted by the Emergency Department Physician who either signs or Co-signs this chart in the absence of a cardiologist.        RADIOLOGY:All plain film, CT, MRI, and formal ultrasound images (except ED bedside ultrasound) are read by the radiologist, see reports below, unless otherwisenoted in MDM or here. XR CHEST PORTABLE   Final Result   No acute cardiopulmonary disease. LABS: All lab results were reviewed by myself, and all abnormals are listed below.   Labs Reviewed   CBC WITH AUTO DIFFERENTIAL - Abnormal; Notable for the following components:       Result Value    Lymphocytes 21 (*)     All other components within normal limits   COMPREHENSIVE METABOLIC PANEL W/ REFLEX TO MG FOR LOW K - Abnormal; Notable for the following components:    Glucose 127 (*)     BUN 31 (*)     CREATININE 0.95 (*)     Bun/Cre Ratio 33 (*)     GFR Non- American 56 (*)     All other components within normal limits   TROPONIN - Abnormal; Notable for the following components:    Troponin, High Sensitivity 23 (*)     All other components within normal limits   OCCULT BLOOD SCREEN       EMERGENCY DEPARTMENTCOURSE:   Patient did well in the ED. Labs:  Potassium 4.0  Creatinine 0.95  Troponin 23  Glucose 127  WBC 7.0  Hemoglobin 12.7  Stool guaiac negative    Chest x-ray negative for acute process. Patient feels well. She has GI doctor at [facility] clinic that she will follow-up with tomorrow. No further work-up indicated at this time. Nursing notes reviewed. At this time this is what I find, the patient appears well and does not appear sick or toxic. I gave my usual and customary discussion of the risks and benefits of discharge versus admission. I answered the patient's questions. I gave the patient strict return precautions. Patient expressed understanding of the discharge instructions. The care is provided during an unprecedented national emergency due to the novel coronavirus, COVID-19. Dictated but not reviewed. Vitals:    Vitals:    05/15/22 1417 05/15/22 1420   BP:  (!) 134/43   Pulse: 54    Resp:  14   Temp: 97.4 °F (36.3 °C)    SpO2:  99%   Weight: 115 lb (52.2 kg)    Height: 5' 5\" (1.651 m)        The patient was given the following medications while in the emergency department:  Orders Placed This Encounter   Medications    0.9 % sodium chloride bolus     CONSULTS:  None    FINAL IMPRESSION      1.  Hematemesis without nausea          DISPOSITION/PLAN   DISPOSITION Decision To Discharge 05/15/2022 03:58:33 PM      PATIENT REFERRED TO:  Agustin Rankin MD  [facility] [phone]    In 2 days      DISCHARGE MEDICATIONS:  New Prescriptions    No medications on file     Santos Perkins MD  Attending Emergency Physician                    Kathy Terrazas MD  05/15/22 1913

## 2022-05-16 LAB
EKG ATRIAL RATE: 46 BPM
EKG P AXIS: 55 DEGREES
EKG P-R INTERVAL: 138 MS
EKG Q-T INTERVAL: 454 MS
EKG QRS DURATION: 76 MS
EKG QTC CALCULATION (BAZETT): 397 MS
EKG R AXIS: 47 DEGREES
EKG T AXIS: 112 DEGREES
EKG VENTRICULAR RATE: 46 BPM

## 2023-08-21 ENCOUNTER — HOSPITAL ENCOUNTER (OUTPATIENT)
Age: 88
Setting detail: OUTPATIENT SURGERY
Discharge: HOME | End: 2023-08-21
Attending: UROLOGY | Admitting: UROLOGY
Payer: MEDICARE

## 2023-08-21 ENCOUNTER — ANESTHESIA (OUTPATIENT)
Dept: OPERATING ROOM | Age: 88
End: 2023-08-21
Payer: MEDICARE

## 2023-08-21 ENCOUNTER — ANESTHESIA EVENT (OUTPATIENT)
Dept: OPERATING ROOM | Age: 88
End: 2023-08-21
Payer: MEDICARE

## 2023-08-21 VITALS
BODY MASS INDEX: 19.99 KG/M2 | WEIGHT: 120 LBS | HEART RATE: 44 BPM | TEMPERATURE: 98.1 F | OXYGEN SATURATION: 97 % | DIASTOLIC BLOOD PRESSURE: 56 MMHG | SYSTOLIC BLOOD PRESSURE: 173 MMHG | RESPIRATION RATE: 16 BRPM | HEIGHT: 65 IN

## 2023-08-21 DIAGNOSIS — C67.9 MALIGNANT NEOPLASM OF URINARY BLADDER, UNSPECIFIED SITE (HCC): ICD-10-CM

## 2023-08-21 LAB
BUN BLD-MCNC: 19 MG/DL (ref 8–26)
EGFR, POC: >60 ML/MIN/1.73M2
GLUCOSE BLD-MCNC: 116 MG/DL (ref 74–100)
POC CREATININE: 0.7 MG/DL (ref 0.51–1.19)

## 2023-08-21 PROCEDURE — 84520 ASSAY OF UREA NITROGEN: CPT

## 2023-08-21 PROCEDURE — 2709999900 HC NON-CHARGEABLE SUPPLY: Performed by: UROLOGY

## 2023-08-21 PROCEDURE — 2580000003 HC RX 258: Performed by: UROLOGY

## 2023-08-21 PROCEDURE — 6360000002 HC RX W HCPCS: Performed by: STUDENT IN AN ORGANIZED HEALTH CARE EDUCATION/TRAINING PROGRAM

## 2023-08-21 PROCEDURE — 3700000001 HC ADD 15 MINUTES (ANESTHESIA): Performed by: UROLOGY

## 2023-08-21 PROCEDURE — 2580000003 HC RX 258: Performed by: ANESTHESIOLOGY

## 2023-08-21 PROCEDURE — 2500000003 HC RX 250 WO HCPCS

## 2023-08-21 PROCEDURE — 7100000011 HC PHASE II RECOVERY - ADDTL 15 MIN: Performed by: UROLOGY

## 2023-08-21 PROCEDURE — 82565 ASSAY OF CREATININE: CPT

## 2023-08-21 PROCEDURE — 82947 ASSAY GLUCOSE BLOOD QUANT: CPT

## 2023-08-21 PROCEDURE — 6360000002 HC RX W HCPCS

## 2023-08-21 PROCEDURE — 3600000003 HC SURGERY LEVEL 3 BASE: Performed by: UROLOGY

## 2023-08-21 PROCEDURE — 88305 TISSUE EXAM BY PATHOLOGIST: CPT

## 2023-08-21 PROCEDURE — 3600000013 HC SURGERY LEVEL 3 ADDTL 15MIN: Performed by: UROLOGY

## 2023-08-21 PROCEDURE — 7100000010 HC PHASE II RECOVERY - FIRST 15 MIN: Performed by: UROLOGY

## 2023-08-21 PROCEDURE — 3700000000 HC ANESTHESIA ATTENDED CARE: Performed by: UROLOGY

## 2023-08-21 RX ORDER — LIDOCAINE HYDROCHLORIDE 10 MG/ML
INJECTION, SOLUTION EPIDURAL; INFILTRATION; INTRACAUDAL; PERINEURAL PRN
Status: DISCONTINUED | OUTPATIENT
Start: 2023-08-21 | End: 2023-08-21 | Stop reason: SDUPTHER

## 2023-08-21 RX ORDER — SODIUM CHLORIDE 0.9 % (FLUSH) 0.9 %
5-40 SYRINGE (ML) INJECTION PRN
Status: DISCONTINUED | OUTPATIENT
Start: 2023-08-21 | End: 2023-08-21 | Stop reason: HOSPADM

## 2023-08-21 RX ORDER — MAGNESIUM HYDROXIDE 1200 MG/15ML
LIQUID ORAL PRN
Status: DISCONTINUED | OUTPATIENT
Start: 2023-08-21 | End: 2023-08-21 | Stop reason: HOSPADM

## 2023-08-21 RX ORDER — PROPOFOL 10 MG/ML
INJECTION, EMULSION INTRAVENOUS CONTINUOUS PRN
Status: DISCONTINUED | OUTPATIENT
Start: 2023-08-21 | End: 2023-08-21 | Stop reason: SDUPTHER

## 2023-08-21 RX ORDER — FENTANYL CITRATE 50 UG/ML
INJECTION, SOLUTION INTRAMUSCULAR; INTRAVENOUS PRN
Status: DISCONTINUED | OUTPATIENT
Start: 2023-08-21 | End: 2023-08-21 | Stop reason: SDUPTHER

## 2023-08-21 RX ORDER — SODIUM CHLORIDE, SODIUM LACTATE, POTASSIUM CHLORIDE, CALCIUM CHLORIDE 600; 310; 30; 20 MG/100ML; MG/100ML; MG/100ML; MG/100ML
INJECTION, SOLUTION INTRAVENOUS CONTINUOUS
Status: DISCONTINUED | OUTPATIENT
Start: 2023-08-21 | End: 2023-08-21 | Stop reason: HOSPADM

## 2023-08-21 RX ORDER — FENTANYL CITRATE 50 UG/ML
25 INJECTION, SOLUTION INTRAMUSCULAR; INTRAVENOUS EVERY 5 MIN PRN
Status: DISCONTINUED | OUTPATIENT
Start: 2023-08-21 | End: 2023-08-21 | Stop reason: HOSPADM

## 2023-08-21 RX ORDER — SODIUM CHLORIDE 9 MG/ML
INJECTION, SOLUTION INTRAVENOUS PRN
Status: DISCONTINUED | OUTPATIENT
Start: 2023-08-21 | End: 2023-08-21 | Stop reason: HOSPADM

## 2023-08-21 RX ORDER — SODIUM CHLORIDE 0.9 % (FLUSH) 0.9 %
5-40 SYRINGE (ML) INJECTION EVERY 12 HOURS SCHEDULED
Status: DISCONTINUED | OUTPATIENT
Start: 2023-08-21 | End: 2023-08-21 | Stop reason: HOSPADM

## 2023-08-21 RX ORDER — CEFADROXIL 500 MG/1
500 CAPSULE ORAL 2 TIMES DAILY
Qty: 6 CAPSULE | Refills: 0 | Status: SHIPPED | OUTPATIENT
Start: 2023-08-21 | End: 2023-08-24

## 2023-08-21 RX ORDER — PROPOFOL 10 MG/ML
INJECTION, EMULSION INTRAVENOUS PRN
Status: DISCONTINUED | OUTPATIENT
Start: 2023-08-21 | End: 2023-08-21 | Stop reason: SDUPTHER

## 2023-08-21 RX ADMIN — PROPOFOL 100 MCG/KG/MIN: 10 INJECTION, EMULSION INTRAVENOUS at 11:37

## 2023-08-21 RX ADMIN — LIDOCAINE HYDROCHLORIDE 50 MG: 10 INJECTION, SOLUTION EPIDURAL; INFILTRATION; INTRACAUDAL; PERINEURAL at 11:37

## 2023-08-21 RX ADMIN — PROPOFOL 10 MG: 10 INJECTION, EMULSION INTRAVENOUS at 11:40

## 2023-08-21 RX ADMIN — PROPOFOL 20 MG: 10 INJECTION, EMULSION INTRAVENOUS at 11:37

## 2023-08-21 RX ADMIN — SODIUM CHLORIDE, POTASSIUM CHLORIDE, SODIUM LACTATE AND CALCIUM CHLORIDE: 600; 310; 30; 20 INJECTION, SOLUTION INTRAVENOUS at 10:14

## 2023-08-21 RX ADMIN — FENTANYL CITRATE 25 MCG: 50 INJECTION, SOLUTION INTRAMUSCULAR; INTRAVENOUS at 11:52

## 2023-08-21 RX ADMIN — PROPOFOL 10 MG: 10 INJECTION, EMULSION INTRAVENOUS at 11:43

## 2023-08-21 RX ADMIN — Medication 2000 MG: at 11:36

## 2023-08-21 ASSESSMENT — LIFESTYLE VARIABLES: SMOKING_STATUS: 0

## 2023-08-21 ASSESSMENT — PAIN - FUNCTIONAL ASSESSMENT: PAIN_FUNCTIONAL_ASSESSMENT: NONE - DENIES PAIN

## 2023-08-21 NOTE — OP NOTE
Operative Note      Patient: Keiry Leon  YOB: 1935  MRN: [id number]    Date of Procedure: 8/21/2023    Pre-Op Diagnosis:  History of bladder cancer    Post-Op Diagnosis: Same       Procedure(s):  Cystoscopy  Bladder biopsy with fulguration    Surgeon(s):  Nilsa Evans MD    Assistant:   Resident: Murray Albrecht MD; Ligia Quinn DO    Anesthesia: Monitor Anesthesia Care    Estimated Blood Loss (mL): Minimal    Complications: None    Specimens:   ID Type Source Tests Collected by Time Destination   A : BLADDER BIOPSY Tissue Bladder SURGICAL PATHOLOGY Nilsa Evans MD 8/21/2023 1154        Implants:  * No implants in log *      Drains:   Urinary Catheter 08/21/23 Mathew (Active)       Findings:   Cystoscopy: Mild papillary carpet on left posterolateral wall, erythematous hypervascularity on right posterolateral wall      Indication:    Narrative of the Procedure:    After informed consent was obtained in the preoperative area, the patient was taken back to the operating room and transferred to the operating table in supine position. EPC cuffs were placed. The machine was turned on. Anesthesia was induced and antibiotics were given. A timeout occurred. Two patient identifiers were used. A 22F scope was carefully inserted into the bladder. In the bladder a full 360 degree cystoscopy was performed. There was a thin papillary carpet seen on the left posterolateral wall. Using a cold cup biopsy forceps the aforementioned lesion was biopsied. 3 tissue samples were taken. The biopsied areas were then fulgurated with a Bugbee device for hemostasis. There was also an area of erythematous hypervascularity on the right posterior lateral wall, and that area was fulgurated. At end of the procedure there was no evidence of bleeding. The patient's bladder was then drained.  All instruments were removed, and the patient was awakened and discharged back to the PACU in good and stable

## 2023-08-21 NOTE — ANESTHESIA PRE PROCEDURE
03:54 PM    GLUCOSE 91 07/28/2023 03:54 PM    PROT 6.6 05/15/2022 03:15 PM    CALCIUM 8.9 07/28/2023 03:54 PM    BILITOT 0.63 05/15/2022 03:15 PM    ALKPHOS 56 05/15/2022 03:15 PM    AST 17 05/15/2022 03:15 PM    ALT 10 05/15/2022 03:15 PM       BMP    Lab Results   Component Value Date/Time     07/28/2023 03:54 PM    K 4.3 07/28/2023 03:54 PM     07/28/2023 03:54 PM    CO2 29 07/28/2023 03:54 PM    BUN 22 07/28/2023 03:54 PM    CREATININE 0.82 07/28/2023 03:54 PM    CALCIUM 8.9 07/28/2023 03:54 PM    GFRAA 79.8 07/28/2023 03:54 PM    LABGLOM 65.9 07/28/2023 03:54 PM    GLUCOSE 91 07/28/2023 03:54 PM       POC Testing  No results for input(s): POCGLU, POCNA, POCK, POCCL, POCBUN, POCHEMO, POCHCT in the last 72 hours. Coags    Lab Results   Component Value Date/Time    PROTIME 13.0 12/19/2021 09:56 PM    INR 1.0 12/19/2021 09:56 PM       HCG (If Applicable) No results found for: PREGTESTUR, PREGSERUM, HCG, HCGQUANT     ABGs No results found for: PHART, PO2ART, RZF9DDL, KVI9BXP, BEART, E1VBKBUI     Type & Screen (If Applicable)  No results found for: LABABO, [facility]    Radiology (If Applicable)    Cardiac Testing (If Applicable) neg stress 5772,RD 55%,mild AS,MR    EKG (If Applicable) SB,PAC,old septal MI          Medications prior to admission:   Prior to Admission medications    Medication Sig Start Date End Date Taking?  Authorizing Provider   metoprolol succinate (TOPROL XL) 25 MG extended release tablet Take 0.5 tablets by mouth daily 6/27/21 8/21/23  Chang Downey MD   aspirin 81 MG EC tablet Take 1 tablet by mouth daily    Historical Provider, MD   loperamide (IMODIUM) 2 MG capsule Take 1 capsule by mouth 4 times daily as needed for Diarrhea    Historical Provider, MD   Calcium Carbonate-Vit D-Min (CALCIUM 1200) 0936-8077 MG-UNIT CHEW Take 1 tablet by mouth daily daily    Historical Provider, MD   Probiotic Product (PROBIOTIC-10 PO) Take 1 capsule by mouth daily     Historical Provider, MD

## 2023-08-21 NOTE — H&P
[facility]  Urology History & Physical      Patient:  Massimo Mcgowan  MRN: [id number]  YOB: 1935    CHIEF COMPLAINT: Bladder lesion    HISTORY OF PRESENT ILLNESS:   The patient is a 81 y.o. female who was found on local cystoscopy to have a bladder lesion. Patient's old records, notes and chart reviewed and summarized above. Past Medical History:    Past Medical History:   Diagnosis Date    Allergic rhinitis     Aortic stenosis 2017    on echo    Arthritis     rt foot    COVID-19 vaccine administered     History of echocardiogram 2017    mild MR, aortic stenosis mild, mild TR, mild pulmonary hypertension    History of stress test 2017    \"normal\" per [facility]    Hyperlipidemia     on rx    Hypertension     per Dr. Junior Wild     Thyroid disease      [facility]    Wears prescription eyeglasses     Wellness examination      [facility] last seen 10/2020       Past Surgical History:    Past Surgical History:   Procedure Laterality Date    CARDIAC SURGERY  05/23/2023    TAVR done at [address] Right 03/10/2022    CAROTID ENDARTERECTOMY (BOVINE PATCH X1) performed by Fahad Delong MD at [address] ruptured\"    COLONOSCOPY      within last 4 yrs    CYSTOSCOPY  11/23/2020    TUR-BT, MITOMYCIN INSTILLATION     CYSTOSCOPY N/A 11/23/2020    CYSTOSCOPY TUR BLADDER TUMOR, GYRUS, MITOMYCIN INSTILLATION performed by Imani Harrell MD at [facility], [address] [facility] approx 2017    EYE SURGERY      ILEOSTOMY OR JEJUNOSTOMY      reversal 2001    LUMBAR LAMINECTOMY  x 2    2 lumbar laminectomy    SHOULDER SURGERY      TOTAL KNEE ARTHROPLASTY Bilateral     VASCULAR SURGERY Right 03/10/2022    [facility]       Medications:    No current facility-administered medications for this encounter. Allergies:     Allergies   Allergen Reactions    Augmentin [Amoxicillin-Pot

## 2023-08-21 NOTE — ANESTHESIA POSTPROCEDURE EVALUATION
Department of Anesthesiology  Postprocedure Note    Patient: Jessi Calix  MRN: [id number]  YOB: 1935  Date of evaluation: 8/21/2023      Procedure Summary     Date: 08/21/23 Room / Location: [address]    Anesthesia Start: 1132 Anesthesia Stop: 8862    Procedure: CYSTOSCOPY BLADDER BIOPSY FULGURATION (Bladder) Diagnosis:       Malignant neoplasm of urinary bladder, unspecified site ([address])      (Malignant neoplasm of urinary bladder, unspecified site ([address]) [C67.9])    Surgeons: Thang Terrazas MD Responsible Provider: Eddie Lagunas MD    Anesthesia Type: MAC ASA Status: 3          Anesthesia Type: No value filed.     Collin Phase I:      Collin Phase II: Collin Score: 8      Anesthesia Post Evaluation    Patient location during evaluation: PACU  Patient participation: complete - patient participated  Level of consciousness: awake and alert  Pain score: 2  Airway patency: patent  Nausea & Vomiting: no nausea and no vomiting  Complications: no  Cardiovascular status: hemodynamically stable  Respiratory status: acceptable  Hydration status: euvolemic  Pain management: adequate

## 2023-08-21 NOTE — DISCHARGE INSTRUCTIONS
Cystoscopy, bladder biopsy with fulguration    Remove laughlin in PACU before discharge  Can restart blood thinners in 48 hours if no blood in urine. If still having bloody urine call clinic regarding when to restart. Pt ok to discharge home in good condition  Pt should avoid strenuous activity   Pt should walk moderately at home  Pt ok to shower  Pt may continue regular diet   Please call attending physician or hospital  with questions  Call or Present to ED if fever (> 101F), intractable nausea vomiting or pain. Rx in chart     Pt should follow up with Dr. Brenda Baeza in office to discuss biopsy results, call to confirm appointment    You may notice blood in the urine, burning with urination, urgency, and frequency of urination after this procedure-this is expected. This should resolve over time. If you were sent home with antibiotics please take these to completion without missing any doses. No alcoholic beverages, no driving or operating machinery, no making important decisions for 24 hours. Children should maintain quiet play ( games, movies, books ) for 24 hours. You may have a normal diet but should eat lightly day of surgery. Drink plenty of fluids.   Urinate within 8 hours after surgery, if unable to urinate call your doctor

## 2023-08-22 LAB — SURGICAL PATHOLOGY REPORT: NORMAL

## (undated) DEVICE — GLOVE SURG SZ 6 CRM LTX FREE POLYISOPRENE POLYMER BEAD ANTI

## (undated) DEVICE — SUTURE VCRL + SZ 3-0 L27IN ABSRB WHT CT-1 1/2 CIR VCP258H

## (undated) DEVICE — CONTAINER,SPECIMEN,4OZ,OR STRL: Brand: MEDLINE

## (undated) DEVICE — GLOVE SURG SZ 75 CRM LTX FREE POLYISOPRENE POLYMER BEAD ANTI

## (undated) DEVICE — POSITIONER,HEAD,MULTIRING,36CS: Brand: MEDLINE

## (undated) DEVICE — Z INACTIVE USE 2635503 SOLUTION IRRIG 3000ML ST H2O USP UROMATIC PLAS CONT

## (undated) DEVICE — BLADE SCALPEL NO 15 STERILE

## (undated) DEVICE — CATHETER URETH 18FR BLLN 5CC SIL ALLY W/ SIL HYDRGEL 2 W F

## (undated) DEVICE — SUTURE VCRL SZ 3-0 L54IN ABSRB UD LIGAPAK REEL POLYGLACTIN J285G

## (undated) DEVICE — PACK PROCEDURE SURG CYSTO SVMMC LF

## (undated) DEVICE — SUNDT™ EXTERNAL CAROTID ENDARTERECTOMY SHUNT
Type: IMPLANTABLE DEVICE | Site: NECK | Status: NON-FUNCTIONAL
Brand: SUNDT™
Removed: 2022-03-10

## (undated) DEVICE — CORD LAPAROSCOPIC MONOPOLAR

## (undated) DEVICE — GOWN,SIRUS,NONRNF,SETINSLV,XL,20/CS: Brand: MEDLINE

## (undated) DEVICE — INTENDED FOR TISSUE SEPARATION, AND OTHER PROCEDURES THAT REQUIRE A SHARP SURGICAL BLADE TO PUNCTURE OR CUT.: Brand: BARD-PARKER ® CARBON RIB-BACK BLADES

## (undated) DEVICE — ADHESIVE SKIN CLOSURE TOP 36 CC HI VISC DERMBND MINI

## (undated) DEVICE — ELECTRODE PT RET AD L9FT HI MOIST COND ADH HYDRGEL CORDED

## (undated) DEVICE — EVACUATOR URO BLDR W/ ADPT UROVAC

## (undated) DEVICE — GARMENT,MEDLINE,DVT,INT,CALF,MED, GEN2: Brand: MEDLINE

## (undated) DEVICE — SUTURE MCRYL + SZ 4 0 L18IN ABSRB UD PC 3 L16MM 3 8 CIR PRIM MCP845G

## (undated) DEVICE — SUTURE PROL SZ 6-0 L24IN NONABSORBABLE BLU L9.3MM BV-1 3/8 8805H

## (undated) DEVICE — GLOVE SURG SZ 65 CRM LTX FREE POLYISOPRENE POLYMER BEAD ANTI

## (undated) DEVICE — GLOVE ORANGE PI 8   MSG9080

## (undated) DEVICE — GLOVE SURG SZ 65 L12IN FNGR THK79MIL GRN LTX FREE

## (undated) DEVICE — SUTURE NONABSORBABLE MONOFILAMENT 7-0 BV-1 1X24 IN PROLENE 8702H

## (undated) DEVICE — SUTURE VCRL + SZ 3-0 L27IN ABSRB UD L26MM SH 1/2 CIR VCP416H

## (undated) DEVICE — HF-RESECTION ELECTRODE PLASMALOOP LOOP, MEDIUM, 24 FR., 12°/16°, ESG TURIS: Brand: OLYMPUS

## (undated) DEVICE — COVER,LIGHT HANDLE,FLX,2/PK: Brand: MEDLINE INDUSTRIES, INC.

## (undated) DEVICE — STRAP,CATHETER,ELASTIC,HOOK&LOOP: Brand: MEDLINE

## (undated) DEVICE — TAPE MED W1/8XL30IN WHT POLY

## (undated) DEVICE — SYRINGE CATH TIP 50ML

## (undated) DEVICE — PLUG,CATHETER,DRAINAGE PROTECTOR,TUBE: Brand: MEDLINE

## (undated) DEVICE — GOWN,AURORA,NONREINFORCED,LARGE: Brand: MEDLINE

## (undated) DEVICE — Device

## (undated) DEVICE — CATHETER,URETHRAL,REDRUBBER,STRL,18FR: Brand: MEDLINE

## (undated) DEVICE — DRAINBAG,ANTI-REFLUX TOWER,L/F,2000ML,LL: Brand: MEDLINE

## (undated) DEVICE — PAD N ADH W3XL4IN POLY COT SFT PERF FLM EASILY CUT ABSRB

## (undated) DEVICE — GLOVE ORANGE PI 7   MSG9070

## (undated) DEVICE — SUTURE PROL SZ 5-0 L36IN NONABSORBABLE BLU L13MM C-1 3/8 8720H